# Patient Record
Sex: MALE | Race: WHITE | Employment: UNEMPLOYED | URBAN - METROPOLITAN AREA
[De-identification: names, ages, dates, MRNs, and addresses within clinical notes are randomized per-mention and may not be internally consistent; named-entity substitution may affect disease eponyms.]

---

## 2020-01-27 ENCOUNTER — OFFICE VISIT (OUTPATIENT)
Dept: DERMATOLOGY | Facility: CLINIC | Age: 30
End: 2020-01-27
Payer: COMMERCIAL

## 2020-01-27 DIAGNOSIS — L70.0 ACNE VULGARIS: ICD-10-CM

## 2020-01-27 DIAGNOSIS — Z79.899 ON ISOTRETINOIN THERAPY: ICD-10-CM

## 2020-01-27 DIAGNOSIS — Z79.899 ON ISOTRETINOIN THERAPY: Primary | ICD-10-CM

## 2020-01-27 LAB
ALBUMIN SERPL-MCNC: 4.1 G/DL (ref 3.4–5)
ALP SERPL-CCNC: 67 U/L (ref 40–150)
ALT SERPL W P-5'-P-CCNC: 21 U/L (ref 0–70)
ANION GAP SERPL CALCULATED.3IONS-SCNC: 6 MMOL/L (ref 3–14)
AST SERPL W P-5'-P-CCNC: 24 U/L (ref 0–45)
BASOPHILS # BLD AUTO: 0.1 10E9/L (ref 0–0.2)
BASOPHILS NFR BLD AUTO: 0.6 %
BILIRUB SERPL-MCNC: 0.5 MG/DL (ref 0.2–1.3)
BUN SERPL-MCNC: 12 MG/DL (ref 7–30)
CALCIUM SERPL-MCNC: 9 MG/DL (ref 8.5–10.1)
CHLORIDE SERPL-SCNC: 101 MMOL/L (ref 94–109)
CHOLEST SERPL-MCNC: 220 MG/DL
CO2 SERPL-SCNC: 29 MMOL/L (ref 20–32)
CREAT SERPL-MCNC: 0.92 MG/DL (ref 0.66–1.25)
DIFFERENTIAL METHOD BLD: NORMAL
EOSINOPHIL # BLD AUTO: 0.1 10E9/L (ref 0–0.7)
EOSINOPHIL NFR BLD AUTO: 1.3 %
ERYTHROCYTE [DISTWIDTH] IN BLOOD BY AUTOMATED COUNT: 13.7 % (ref 10–15)
GFR SERPL CREATININE-BSD FRML MDRD: >90 ML/MIN/{1.73_M2}
GLUCOSE SERPL-MCNC: 84 MG/DL (ref 70–99)
HCT VFR BLD AUTO: 46.3 % (ref 40–53)
HDLC SERPL-MCNC: 50 MG/DL
HGB BLD-MCNC: 15.5 G/DL (ref 13.3–17.7)
IMM GRANULOCYTES # BLD: 0 10E9/L (ref 0–0.4)
IMM GRANULOCYTES NFR BLD: 0.2 %
LDLC SERPL CALC-MCNC: 143 MG/DL
LYMPHOCYTES # BLD AUTO: 1.6 10E9/L (ref 0.8–5.3)
LYMPHOCYTES NFR BLD AUTO: 16.6 %
MCH RBC QN AUTO: 31.2 PG (ref 26.5–33)
MCHC RBC AUTO-ENTMCNC: 33.5 G/DL (ref 31.5–36.5)
MCV RBC AUTO: 93 FL (ref 78–100)
MONOCYTES # BLD AUTO: 0.7 10E9/L (ref 0–1.3)
MONOCYTES NFR BLD AUTO: 7.5 %
NEUTROPHILS # BLD AUTO: 7.3 10E9/L (ref 1.6–8.3)
NEUTROPHILS NFR BLD AUTO: 73.8 %
NONHDLC SERPL-MCNC: 170 MG/DL
NRBC # BLD AUTO: 0 10*3/UL
NRBC BLD AUTO-RTO: 0 /100
PLATELET # BLD AUTO: 213 10E9/L (ref 150–450)
POTASSIUM SERPL-SCNC: 3.6 MMOL/L (ref 3.4–5.3)
PROT SERPL-MCNC: 7.5 G/DL (ref 6.8–8.8)
RBC # BLD AUTO: 4.97 10E12/L (ref 4.4–5.9)
SODIUM SERPL-SCNC: 136 MMOL/L (ref 133–144)
TRIGL SERPL-MCNC: 133 MG/DL
WBC # BLD AUTO: 9.9 10E9/L (ref 4–11)

## 2020-01-27 RX ORDER — ISOTRETINOIN 40 MG/1
40 CAPSULE ORAL DAILY
Qty: 30 CAPSULE | Refills: 0 | Status: SHIPPED | OUTPATIENT
Start: 2020-01-27 | End: 2020-02-24

## 2020-01-27 ASSESSMENT — PAIN SCALES - GENERAL: PAINLEVEL: NO PAIN (0)

## 2020-01-27 NOTE — LETTER
"1/27/2020       RE: Collins Dunaway  [address]  [facility] [address]     Dear Colleague,    Thank you for referring your patient, Collins Dunaway, to the [facility] DERMATOLOGY at [facility]. Please see a copy of my visit note below.    [facility] Dermatology Note      Dermatology Problem List:  1. Acne vulgaris  -Current tx: Start isotretinoin 40 mg every day 1/27/2020 - start of month #1    Encounter Date: Jan 27, 2020    CC:  Chief Complaint   Patient presents with     Derm Problem     Collins is here today to be seen for acne.          History of Present Illness:  Mr. Collins Dunaway is a 29 year old male who is new to the clinic and presents for acne. At today's visit, the patient notes he has been dealing with body acne on and off \"forever\". He states he has tried benzoyl peroxide and moisturizer to treat this acne. He adds he went to a dermatologist approximately 8 years ago but did not follow through with their treatment plan. He denies taking any medications currently. He states he is using BPO 2.5% wash, cleanser and moisturizer from [email or web address] on his entire body. He has noticed some improvement on this regimen. He denies additional lesions or areas of concern. The patient denies a history of depression or anxiety. The patient denies a history of IBS or Crohn's disease. The patient denies painful, itching, tingling or bleeding lesions unless otherwise noted.    Past Medical History:   There is no problem list on file for this patient.    History reviewed. No pertinent past medical history.  History reviewed. No pertinent surgical history.    Social History:   reports that he has never smoked. He has never used smokeless tobacco.    Family History:  History reviewed. No pertinent family history.    Medications:  No current outpatient medications on file.       Allergies   Allergen Reactions     Cats        Review of Systems:  -Constitutional: The " patient denies fatigue, fevers, chills, unintended weight loss, and night sweats.  -HEENT: Patient denies nonhealing oral sores.  -Skin: As above in HPI. No additional skin concerns.  -Neuro: no HA or vision changes  -GI: No nausea, blood in stool, diarrhea, hx of IBD  -Psych: no depression/anxiety, mood changes, or sleep problems   -Musculoskeletal: no joint or muscle pain or swelling   -Heme/Lymph: no concerning bumps, no bleeding problems    Physical exam:  Vitals: There were no vitals taken for this visit.  GEN: This is a well developed, well-nourished male in no acute distress, in a pleasant mood.    SKIN: Acne exam, which includes the face, neck, upper central chest, and upper central back was performed.  -Scattered active papules and comedones with numerous hypo- and hyperpigmented scars and brown macules  -Scattered inflammatory papules and inflamed nodules on the chest    -Few active papules on the lower face  -No other lesions of concern on areas examined.     Impression/Plan:  1. Acne vulgaris, severe nodulocystic primarily on the trunk/torso    Educated on the etiology     Discussed treatment options including oral antibiotics and accutane  Discussion of the risks and side effects of isotretinoin including but not limited to mucocutaneous dryness, arthralgias, myalgias, depression, suicidal ideation, headache, blurred vision, increase in liver function tests, and increase in lipids. The iPledge program brochure was provided and the contents discussed with the patient. The patient was counseled that they cannot give blood while on isotretinoin. No personal or family history of inflammatory bowel disease or hypertriglyceridemia known to patient. Reviewed need to avoid alcohol on medication. The iPledge program consent was obtained. Patient counseled that if they wear contacts, the eyes may become too dry to tolerate. Recommend follow up with eye doctor if this occurs.   At this visit, we will begin  isotretinoin 40mg daily. One month supply with no refills provided. Goal dose is 150-220mg/kg for this 81.81 kg patient.    Baseline labs including CBC, BUN/Cr, lipids and AST/ALT will be obtained.     Total cumulative dose 0mg.   Patient's iPledge # is [id number].     The patient will stop all other acne medications.       Recommended Cerave moisturizing cream and Cetaphil cleanser    CC Dr. Joyce on close of this encounter.  Follow-up in 1 month, earlier for new or changing lesions.       Staff Involved:  Staff/Scribe    Scribe Disclosure:  I, Mike Holcomb, am serving as a scribe to document services personally performed by Elaine Reyes PA-C, based on data collection and the provider's statements to me.     Provider Disclosure:   The documentation recorded by the scribe accurately reflects the services I personally performed and the decisions made by me.    All risks, benefits and alternatives were discussed with patient.  Patient is in agreement and understands the assessment and plan.  All questions were answered.    Elaine Reyes PA-C, [facility]  [facility] [place]: Phone: [phone], Fax: [phone]  [facility]: Phone: [phone],  Fax: [phone]

## 2020-01-28 NOTE — PROGRESS NOTES
"[facility] Dermatology Note      Dermatology Problem List:  1. Acne vulgaris  -Current tx: Start isotretinoin 40 mg every day 1/27/2020 - start of month #1    Encounter Date: Jan 27, 2020    CC:  Chief Complaint   Patient presents with     Derm Problem     Collins is here today to be seen for acne.          History of Present Illness:  Mr. Collins Dunaway is a 29 year old male who is new to the clinic and presents for acne. At today's visit, the patient notes he has been dealing with body acne on and off \"forever\". He states he has tried benzoyl peroxide and moisturizer to treat this acne. He adds he went to a dermatologist approximately 8 years ago but did not follow through with their treatment plan. He denies taking any medications currently. He states he is using BPO 2.5% wash, cleanser and moisturizer from [email or web address] on his entire body. He has noticed some improvement on this regimen. He denies additional lesions or areas of concern. The patient denies a history of depression or anxiety. The patient denies a history of IBS or Crohn's disease. The patient denies painful, itching, tingling or bleeding lesions unless otherwise noted.    Past Medical History:   There is no problem list on file for this patient.    History reviewed. No pertinent past medical history.  History reviewed. No pertinent surgical history.    Social History:   reports that he has never smoked. He has never used smokeless tobacco.    Family History:  History reviewed. No pertinent family history.    Medications:  No current outpatient medications on file.       Allergies   Allergen Reactions     Cats        Review of Systems:  -Constitutional: The patient denies fatigue, fevers, chills, unintended weight loss, and night sweats.  -HEENT: Patient denies nonhealing oral sores.  -Skin: As above in HPI. No additional skin concerns.  -Neuro: no HA or vision changes  -GI: No nausea, blood in stool, diarrhea, hx of IBD  -Psych: no " depression/anxiety, mood changes, or sleep problems   -Musculoskeletal: no joint or muscle pain or swelling   -Heme/Lymph: no concerning bumps, no bleeding problems    Physical exam:  Vitals: There were no vitals taken for this visit.  GEN: This is a well developed, well-nourished male in no acute distress, in a pleasant mood.    SKIN: Acne exam, which includes the face, neck, upper central chest, and upper central back was performed.  -Scattered active papules and comedones with numerous hypo- and hyperpigmented scars and brown macules  -Scattered inflammatory papules and inflamed nodules on the chest    -Few active papules on the lower face  -No other lesions of concern on areas examined.     Impression/Plan:  1. Acne vulgaris, severe nodulocystic primarily on the trunk/torso    Educated on the etiology     Discussed treatment options including oral antibiotics and accutane  Discussion of the risks and side effects of isotretinoin including but not limited to mucocutaneous dryness, arthralgias, myalgias, depression, suicidal ideation, headache, blurred vision, increase in liver function tests, and increase in lipids. The iPledge program brochure was provided and the contents discussed with the patient. The patient was counseled that they cannot give blood while on isotretinoin. No personal or family history of inflammatory bowel disease or hypertriglyceridemia known to patient. Reviewed need to avoid alcohol on medication. The iPledge program consent was obtained. Patient counseled that if they wear contacts, the eyes may become too dry to tolerate. Recommend follow up with eye doctor if this occurs.   At this visit, we will begin isotretinoin 40mg daily. One month supply with no refills provided. Goal dose is 150-220mg/kg for this 81.81 kg patient.    Baseline labs including CBC, BUN/Cr, lipids and AST/ALT will be obtained.     Total cumulative dose 0mg.   Patient's iPledge # is [id number].     The patient will  stop all other acne medications.       Recommended Cerave moisturizing cream and Cetaphil cleanser    CC Dr. Joyce on close of this encounter.  Follow-up in 1 month, earlier for new or changing lesions.       Staff Involved:  Staff/Scribe    Scribe Disclosure:  I, Mike Holcomb, am serving as a scribe to document services personally performed by Elaine Reyes PA-C, based on data collection and the provider's statements to me.     Provider Disclosure:   The documentation recorded by the scribe accurately reflects the services I personally performed and the decisions made by me.    All risks, benefits and alternatives were discussed with patient.  Patient is in agreement and understands the assessment and plan.  All questions were answered.    Elaine Reyes PA-C, MPAS  [facility] [place]: Phone: [phone], Fax: [phone]  [facility]: Phone: [phone],  Fax: [phone]

## 2020-01-28 NOTE — NURSING NOTE
Dermatology Rooming Note    Collins Dunaway's goals for this visit include:   Chief Complaint   Patient presents with     Derm Problem     Collins is here today to be seen for acne.      JOY Sarkar

## 2020-01-29 DIAGNOSIS — Z79.899 ON ISOTRETINOIN THERAPY: Primary | ICD-10-CM

## 2020-02-20 DIAGNOSIS — Z79.899 ON ISOTRETINOIN THERAPY: ICD-10-CM

## 2020-02-20 LAB
ALBUMIN SERPL-MCNC: 4.6 G/DL (ref 3.4–5)
ALP SERPL-CCNC: 72 U/L (ref 40–150)
ALT SERPL W P-5'-P-CCNC: 14 U/L (ref 0–70)
ANION GAP SERPL CALCULATED.3IONS-SCNC: 8 MMOL/L (ref 3–14)
AST SERPL W P-5'-P-CCNC: 16 U/L (ref 0–45)
BILIRUB SERPL-MCNC: 0.4 MG/DL (ref 0.2–1.3)
BUN SERPL-MCNC: 15 MG/DL (ref 7–30)
CALCIUM SERPL-MCNC: 9.5 MG/DL (ref 8.5–10.1)
CHLORIDE SERPL-SCNC: 102 MMOL/L (ref 94–109)
CHOLEST SERPL-MCNC: 308 MG/DL
CO2 SERPL-SCNC: 26 MMOL/L (ref 20–32)
CREAT SERPL-MCNC: 0.88 MG/DL (ref 0.66–1.25)
ERYTHROCYTE [DISTWIDTH] IN BLOOD BY AUTOMATED COUNT: 13.2 % (ref 10–15)
GFR SERPL CREATININE-BSD FRML MDRD: >90 ML/MIN/{1.73_M2}
GLUCOSE SERPL-MCNC: 85 MG/DL (ref 70–99)
HCT VFR BLD AUTO: 50.9 % (ref 40–53)
HDLC SERPL-MCNC: 48 MG/DL
HGB BLD-MCNC: 17 G/DL (ref 13.3–17.7)
LDLC SERPL CALC-MCNC: 244 MG/DL
MCH RBC QN AUTO: 30.9 PG (ref 26.5–33)
MCHC RBC AUTO-ENTMCNC: 33.4 G/DL (ref 31.5–36.5)
MCV RBC AUTO: 93 FL (ref 78–100)
NONHDLC SERPL-MCNC: 260 MG/DL
PLATELET # BLD AUTO: 226 10E9/L (ref 150–450)
POTASSIUM SERPL-SCNC: 3.9 MMOL/L (ref 3.4–5.3)
PROT SERPL-MCNC: 8.1 G/DL (ref 6.8–8.8)
RBC # BLD AUTO: 5.5 10E12/L (ref 4.4–5.9)
SODIUM SERPL-SCNC: 137 MMOL/L (ref 133–144)
TRIGL SERPL-MCNC: 77 MG/DL
WBC # BLD AUTO: 7.8 10E9/L (ref 4–11)

## 2020-02-24 ENCOUNTER — OFFICE VISIT (OUTPATIENT)
Dept: DERMATOLOGY | Facility: CLINIC | Age: 30
End: 2020-02-24
Payer: COMMERCIAL

## 2020-02-24 DIAGNOSIS — Z79.899 ON ISOTRETINOIN THERAPY: ICD-10-CM

## 2020-02-24 DIAGNOSIS — L70.0 ACNE VULGARIS: Primary | ICD-10-CM

## 2020-02-24 RX ORDER — ISOTRETINOIN 40 MG/1
80 CAPSULE ORAL DAILY
Qty: 60 CAPSULE | Refills: 0 | Status: SHIPPED | OUTPATIENT
Start: 2020-02-24 | End: 2020-03-30

## 2020-02-24 ASSESSMENT — PAIN SCALES - GENERAL: PAINLEVEL: NO PAIN (0)

## 2020-02-24 NOTE — LETTER
2/24/2020       RE: Collins Dunaway  [address]  [facility] [address]     Dear Colleague,    Thank you for referring your patient, Collins Dunaway, to the [facility] DERMATOLOGY at [facility]. Please see a copy of my visit note below.    [facility] Dermatology Note      Dermatology Problem List:  1. Acne vulgaris  -Current tx: Start isotretinoin 40 mg every day 1/27/2020, increased to 80 mg every day 2/24/2020 - end of month #1    Encounter Date: Feb 24, 2020    CC:  Chief Complaint   Patient presents with     Accutane     Collins is here today for an accutane follow up.          History of Present Illness:  Mr. Collins Dunaway is a 29 year old male who presents as a follow up for acne. The patient was last seen on 1/27/2020 when isotretinoin 40 mg every day was started for acne. At today's visit, the patient notes he is doing well after the first month of accutane. The patient states dryness has been noticeable but manageable with moisturizer use. He notes that his acne on the face has improved since starting accutane but believes his back acne is still the same. Does not note an acne flare. The patient denies additional lesions or areas of concern. The patient reports tolerable mucocutaneous dryness, and denies arthralgias, myalgias, depression, suicidal ideation, diarrhea, headache, or blurred vision.      Past Medical History:   There is no problem list on file for this patient.    No past medical history on file.  No past surgical history on file.    Social History:   reports that he has never smoked. He has never used smokeless tobacco.    Family History:  No family history on file.    Medications:  Current Outpatient Medications   Medication Sig Dispense Refill     ISOtretinoin (ACCUTANE) 40 MG capsule Take 1 capsule (40 mg) by mouth daily 30 capsule 0       Allergies   Allergen Reactions     Cats        Review of Systems:  -Constitutional: The patient  denies fatigue, fevers, chills, unintended weight loss, and night sweats.  -HEENT: Patient denies nonhealing oral sores.  -Skin: As above in HPI. No additional skin concerns.  -Neuro: no HA or vision changes  -GI: No nausea, blood in stool, diarrhea, hx of IBD  -Psych: no depression/anxiety, mood changes, or sleep problems   -Musculoskeletal: no joint or muscle pain or swelling   -Heme/Lymph: no concerning bumps, no bleeding problems    Physical exam:  Vitals: There were no vitals taken for this visit.  GEN: This is a well developed, well-nourished male in no acute distress, in a pleasant mood.    SKIN: Acne exam, which includes the face, neck, upper central chest, and upper central back was performed.  -Scattered active papules and comedones with numerous hypo- and hyperpigmented scars and brown macules on the back. Unchanged since the last visit.   -Scattered inflammatory papules and inflamed nodules on the chest    -Face is improved  -No other lesions of concern on areas examined.     Impression/Plan:  1. Acne vulgaris, severe nodulocystic primarily on the trunk/torso    Educated on the etiology     Discussed treatment options including oral antibiotics and accutane  Discussion of the risks and side effects of isotretinoin including but not limited to mucocutaneous dryness, arthralgias, myalgias, depression, suicidal ideation, headache, blurred vision, increase in liver function tests, and increase in lipids. The patient was counseled that they cannot give blood while on isotretinoin.   At this visit, we will increase the dose of isotretinoin to 80mg daily. One month supply with no refills provided. Goal dose is 150-220mg/kg for this 81.81 kg patient.    Labs reviewed, lipids elevated, but patient was not fasting. CBC and CMP nml. Will repeat fasting lipids in 1 week. Explained to patient the importance of fasting lipids in order to get a more accurate result.    Total cumulative dose 1200 mg (14.67  mg/kg)  Patient's iPledge # is [id number].     Recommended Cerave moisturizing cream and Cetaphil cleanser    CC Dr. Joyce on close of this encounter.  Follow-up in 1 month, earlier for new or changing lesions.       Staff Involved:  Staff/Scribe    Scribe Disclosure:  I, Mike Holcomb, am serving as a scribe to document services personally performed by Elaine Reyes PA-C, based on data collection and the provider's statements to me.     Provider Disclosure:   The documentation recorded by the scribe accurately reflects the services I personally performed and the decisions made by me.    All risks, benefits and alternatives were discussed with patient.  Patient is in agreement and understands the assessment and plan.  All questions were answered.    Elaine Reyes PA-C, MPAS  [facility] [place]: Phone: [phone], Fax: [phone]  [facility]: Phone: [phone],  Fax: [phone]

## 2020-02-24 NOTE — NURSING NOTE
Dermatology Rooming Note    Collins Dunaway's goals for this visit include:   Chief Complaint   Patient presents with     Accutane     Collins is here today for an accutane follow up.      JOY Sarkar

## 2020-02-24 NOTE — PROGRESS NOTES
[facility] Dermatology Note      Dermatology Problem List:  1. Acne vulgaris  -Current tx: Start isotretinoin 40 mg every day 1/27/2020, increased to 80 mg every day 2/24/2020 - end of month #1    Encounter Date: Feb 24, 2020    CC:  Chief Complaint   Patient presents with     Accutane     Collins is here today for an accutane follow up.          History of Present Illness:  Mr. Collins Dunaway is a 29 year old male who presents as a follow up for acne. The patient was last seen on 1/27/2020 when isotretinoin 40 mg every day was started for acne. At today's visit, the patient notes he is doing well after the first month of accutane. The patient states dryness has been noticeable but manageable with moisturizer use. He notes that his acne on the face has improved since starting accutane but believes his back acne is still the same. Does not note an acne flare. The patient denies additional lesions or areas of concern. The patient reports tolerable mucocutaneous dryness, and denies arthralgias, myalgias, depression, suicidal ideation, diarrhea, headache, or blurred vision.      Past Medical History:   There is no problem list on file for this patient.    No past medical history on file.  No past surgical history on file.    Social History:   reports that he has never smoked. He has never used smokeless tobacco.    Family History:  No family history on file.    Medications:  Current Outpatient Medications   Medication Sig Dispense Refill     ISOtretinoin (ACCUTANE) 40 MG capsule Take 1 capsule (40 mg) by mouth daily 30 capsule 0       Allergies   Allergen Reactions     Cats        Review of Systems:  -Constitutional: The patient denies fatigue, fevers, chills, unintended weight loss, and night sweats.  -HEENT: Patient denies nonhealing oral sores.  -Skin: As above in HPI. No additional skin concerns.  -Neuro: no HA or vision changes  -GI: No nausea, blood in stool, diarrhea, hx of IBD  -Psych: no  depression/anxiety, mood changes, or sleep problems   -Musculoskeletal: no joint or muscle pain or swelling   -Heme/Lymph: no concerning bumps, no bleeding problems    Physical exam:  Vitals: There were no vitals taken for this visit.  GEN: This is a well developed, well-nourished male in no acute distress, in a pleasant mood.    SKIN: Acne exam, which includes the face, neck, upper central chest, and upper central back was performed.  -Scattered active papules and comedones with numerous hypo- and hyperpigmented scars and brown macules on the back. Unchanged since the last visit.   -Scattered inflammatory papules and inflamed nodules on the chest    -Face is improved  -No other lesions of concern on areas examined.     Impression/Plan:  1. Acne vulgaris, severe nodulocystic primarily on the trunk/torso    Educated on the etiology     Discussed treatment options including oral antibiotics and accutane  Discussion of the risks and side effects of isotretinoin including but not limited to mucocutaneous dryness, arthralgias, myalgias, depression, suicidal ideation, headache, blurred vision, increase in liver function tests, and increase in lipids. The patient was counseled that they cannot give blood while on isotretinoin.   At this visit, we will increase the dose of isotretinoin to 80mg daily. One month supply with no refills provided. Goal dose is 150-220mg/kg for this 81.81 kg patient.    Labs reviewed, lipids elevated, but patient was not fasting. CBC and CMP nml. Will repeat fasting lipids in 1 week. Explained to patient the importance of fasting lipids in order to get a more accurate result.    Total cumulative dose 1200 mg (14.67 mg/kg)  Patient's iPledge # is [id number].     Recommended Cerave moisturizing cream and Cetaphil cleanser    CC Dr. Joyce on close of this encounter.  Follow-up in 1 month, earlier for new or changing lesions.       Staff Involved:  Staff/Scribe    Scribe Disclosure:  Mike MARTIN  Zhang, am serving as a scribe to document services personally performed by Elaine Reyes PA-C, based on data collection and the provider's statements to me.     Provider Disclosure:   The documentation recorded by the scribe accurately reflects the services I personally performed and the decisions made by me.    All risks, benefits and alternatives were discussed with patient.  Patient is in agreement and understands the assessment and plan.  All questions were answered.    Elaine Reyes PA-C, MPAS  [facility] [place]: Phone: [phone], Fax: [phone]  [facility]: Phone: [phone],  Fax: [phone]

## 2020-03-11 ENCOUNTER — HEALTH MAINTENANCE LETTER (OUTPATIENT)
Age: 30
End: 2020-03-11

## 2020-03-16 ENCOUNTER — TELEPHONE (OUTPATIENT)
Dept: DERMATOLOGY | Facility: CLINIC | Age: 30
End: 2020-03-16

## 2020-03-16 NOTE — TELEPHONE ENCOUNTER
I called the patient back and changed his appointment to a phone appointment on the 23rd.   Angelica Garcia, [facility]

## 2020-03-16 NOTE — TELEPHONE ENCOUNTER
[facility] Call Center    Phone Message    May a detailed message be left on voicemail: yes     Reason for Call: Other: Pt recieved a call asking him to change this appt into a telephone appt.  The Pt said yes he would do the telephone, but I was unable to find that option in the change visit drop down for this appt.     Action Taken: Message routed to:  [facility] ([facility] dermatology    Travel Screening: Not Applicable

## 2020-03-16 NOTE — TELEPHONE ENCOUNTER
Called pt and LVM. I wanted to see if he would be willing to have a phone follow up on 3/23/20. Clinic number provided.  JOY Sarkar

## 2020-03-23 ENCOUNTER — VIRTUAL VISIT (OUTPATIENT)
Dept: DERMATOLOGY | Facility: CLINIC | Age: 30
End: 2020-03-23
Payer: COMMERCIAL

## 2020-03-23 DIAGNOSIS — Z79.899 ON ISOTRETINOIN THERAPY: Primary | ICD-10-CM

## 2020-03-23 DIAGNOSIS — L70.0 ACNE VULGARIS: ICD-10-CM

## 2020-03-23 ASSESSMENT — PAIN SCALES - GENERAL: PAINLEVEL: NO PAIN (0)

## 2020-03-23 NOTE — PROGRESS NOTES
"Collins Dunaway is a 29 year old male who is being evaluated via a billable telephone visit.      The patient has been notified of following:     \"This telephone visit will be conducted via a call between you and your physician/provider. We have found that certain health care needs can be provided without the need for a physical exam.  This service lets us provide the care you need with a short phone conversation.  If a prescription is necessary we can send it directly to your pharmacy.  If lab work is needed we can place an order for that and you can then stop by our lab to have the test done at a later time.    If during the course of the call the physician/provider feels a telephone visit is not appropriate, you will not be charged for this service.\"     The patient has verbally consented to this service.      Collins Dunaway complains of    Chief Complaint   Patient presents with     Accutane     Collins is following up on accutane.        I have reviewed and updated the patient's Past Medical History, Social History, Family History and Medication List.    ALLERGIES  Cats      Additional provider notes:   Dermatology Problem List:  1. Acne vulgaris  -Current tx: Start isotretinoin 40 mg every day 1/27/2020, increased to 80 mg every day 2/24/2020 - end of month #2    I spoke with the patient    The reason for he telephone visit was isotretinoin follow-up    Pertinent history and ROS    Collins Dunaway is a 28yo male patient who was last seen on 2/24/2020 regarding acne. He is currently on isotretinoin 80mg daily. He notes his chest acne has improved this past month as well as his back acne, but he has been getting some new breakouts there. He notes his face is clear. He states skin and especially his lips are dry but he is managing with moisturizers. The patient reports tolerable mucocutaneous dryness, and denies arthralgias, myalgias, depression, suicidal ideation, diarrhea, headache, or blurred vision.      Review " of Systems:  -Constitutional: The patient denies fatigue, fevers, chills, unintended weight loss, and night sweats.  -HEENT: Patient denies nonhealing oral sores.  -Skin: As above in HPI. No additional skin concerns.  -Neuro: no HA or vision changes  -GI: No nausea, blood in stool, diarrhea, hx of IBD  -Psych: no depression/anxiety, mood changes, or sleep problems   -Musculoskeletal: no joint or muscle pain or swelling   -Heme/Lymph: no concerning bumps, no bleeding problems       Assessment/Plan:    1. Acne vulgaris, severe nodulocystic primarily on the trunk/torso    Educated on the etiology      At this visit, we will continue the dose of isotretinoin to 80mg daily. One month supply with no refills provided. Goal dose is 150-220mg/kg for this 81.81 kg patient.      Labs reviewed, lipids elevated, but patient was not fasting. CBC and CMP nml. Will repeat fasting lipids in 1 week. Explained to patient the importance of fasting lipids in order to get a more accurate result.    Total cumulative dose 3600 mg (44mg/kg)    Patient's iPledge # is [id number].     Recommended Cerave moisturizing cream and Cetaphil cleanser    I have reviewed the note as documented above.  This accurately captures the substance of my conversation with the patient.    CC Dr. Joyce at the close of this encounter.  Follow-up 1mo, earlier for new or changing lesions    Phone call contact time  Call Started at 3:51pm  Call Ended at 3:58pm    All risks, benefits and alternatives were discussed with patient.  Patient is in agreement and understands the assessment and plan.  All questions were answered.    Elaine Reyes PA-C, MPAS  [facility] [place]: Phone: [phone], Fax: [phone]  [facility]: Phone: [phone],  Fax: [phone]

## 2020-03-23 NOTE — PROGRESS NOTES
Dermatology Rooming Note    Collins Dunaway's goals for this visit include:   Chief Complaint   Patient presents with     Accutane     Collins is following up on accutane.      JOY Sarkar

## 2020-03-26 DIAGNOSIS — Z79.899 ON ISOTRETINOIN THERAPY: ICD-10-CM

## 2020-03-26 LAB
ALBUMIN SERPL-MCNC: 4.2 G/DL (ref 3.4–5)
ALP SERPL-CCNC: 69 U/L (ref 40–150)
ALT SERPL W P-5'-P-CCNC: 21 U/L (ref 0–70)
ANION GAP SERPL CALCULATED.3IONS-SCNC: 4 MMOL/L (ref 3–14)
AST SERPL W P-5'-P-CCNC: 36 U/L (ref 0–45)
BILIRUB SERPL-MCNC: 0.4 MG/DL (ref 0.2–1.3)
BUN SERPL-MCNC: 12 MG/DL (ref 7–30)
CALCIUM SERPL-MCNC: 8.9 MG/DL (ref 8.5–10.1)
CHLORIDE SERPL-SCNC: 106 MMOL/L (ref 94–109)
CHOLEST SERPL-MCNC: 252 MG/DL
CO2 SERPL-SCNC: 28 MMOL/L (ref 20–32)
CREAT SERPL-MCNC: 0.96 MG/DL (ref 0.66–1.25)
ERYTHROCYTE [DISTWIDTH] IN BLOOD BY AUTOMATED COUNT: 13.4 % (ref 10–15)
GFR SERPL CREATININE-BSD FRML MDRD: >90 ML/MIN/{1.73_M2}
GLUCOSE SERPL-MCNC: 78 MG/DL (ref 70–99)
HCT VFR BLD AUTO: 45.5 % (ref 40–53)
HDLC SERPL-MCNC: 53 MG/DL
HGB BLD-MCNC: 14.8 G/DL (ref 13.3–17.7)
LDLC SERPL CALC-MCNC: 179 MG/DL
MCH RBC QN AUTO: 30.5 PG (ref 26.5–33)
MCHC RBC AUTO-ENTMCNC: 32.5 G/DL (ref 31.5–36.5)
MCV RBC AUTO: 94 FL (ref 78–100)
NONHDLC SERPL-MCNC: 198 MG/DL
PLATELET # BLD AUTO: 191 10E9/L (ref 150–450)
POTASSIUM SERPL-SCNC: 3.4 MMOL/L (ref 3.4–5.3)
PROT SERPL-MCNC: 7.6 G/DL (ref 6.8–8.8)
RBC # BLD AUTO: 4.86 10E12/L (ref 4.4–5.9)
SODIUM SERPL-SCNC: 139 MMOL/L (ref 133–144)
TRIGL SERPL-MCNC: 95 MG/DL
WBC # BLD AUTO: 7.4 10E9/L (ref 4–11)

## 2020-03-30 ENCOUNTER — MYC REFILL (OUTPATIENT)
Dept: DERMATOLOGY | Facility: CLINIC | Age: 30
End: 2020-03-30

## 2020-03-30 DIAGNOSIS — L70.0 ACNE VULGARIS: ICD-10-CM

## 2020-03-30 RX ORDER — ISOTRETINOIN 40 MG/1
80 CAPSULE ORAL DAILY
Qty: 60 CAPSULE | Refills: 0 | Status: SHIPPED | OUTPATIENT
Start: 2020-03-30 | End: 2020-04-20

## 2020-04-20 ENCOUNTER — VIRTUAL VISIT (OUTPATIENT)
Dept: DERMATOLOGY | Facility: CLINIC | Age: 30
End: 2020-04-20
Payer: COMMERCIAL

## 2020-04-20 DIAGNOSIS — Z79.899 ON ISOTRETINOIN THERAPY: Primary | ICD-10-CM

## 2020-04-20 DIAGNOSIS — L70.0 ACNE VULGARIS: ICD-10-CM

## 2020-04-20 RX ORDER — ISOTRETINOIN 40 MG/1
80 CAPSULE ORAL DAILY
Qty: 60 CAPSULE | Refills: 0 | Status: SHIPPED | OUTPATIENT
Start: 2020-04-20 | End: 2020-06-01

## 2020-04-20 ASSESSMENT — PAIN SCALES - GENERAL: PAINLEVEL: NO PAIN (0)

## 2020-04-20 NOTE — PATIENT INSTRUCTIONS
[facility] Teledermatology Visit    Thank you for allowing us to participate in your care. Your findings, instructions and follow-up plan are as follows:      Acne  - continue isotretinoin 80mg daily  -continue with sun protection  -please have fasting labs drawn as soon as you are able  -please upload photos to [employer] as soon as possible  -Follow-up in 1mo via video visit    When should I call my doctor?    If you are worsening or not improving, please, contact us or seek urgent care as noted below.     Who should I call with questions (adults)?    [facility] (adult and pediatric): [phone]     [facility] (adult): [phone]    For urgent needs outside of business hours call the [facility] at [phone] and ask for the dermatology resident on call    If this is a medical emergency and you are unable to reach an ER, Call 911      Who should I call with questions (pediatric)?  [facility]- Pediatric Dermatology  Dr. Michelle Jarvis, Dr. Otis Urias, Dr. Hannah Burciaga, [place], [place]  Dr. Leilani Jameson, Dr. Kori Joyce & Dr. Shoaib Oneill  Non Urgent  Nurse Triage Line; [phone]- Mi and Nilam FRANCES Care Coordinators   Emilie (/Complex ) [phone]    If you need a prescription refill, please contact your pharmacy. Refills are approved or denied by our Physicians during normal business hours, Monday through Fridays  Per office policy, refills will not be granted if you have not been seen within the past year (or sooner depending on your child's condition)    Scheduling Information:  Pediatric Appointment Scheduling and Call Center [phone]  Radiology Scheduling- [phone]  Sedation Unit Scheduling- [phone]  [place] Scheduling- General [phone]; Pediatric Dermatology [phone]  Main  Services: [phone]  Fijian:  [phone]  Moroccan: [phone]  Mel/Kinyarwanda/Carlito: [phone]  Preadmission Nursing Department Fax Number: [phone] (Fax all pre-operative paperwork to this number)    For urgent matters arising during evenings, weekends, or holidays that cannot wait for normal business hours please call [phone] and ask for the Dermatology Resident On-Call to be paged.

## 2020-04-20 NOTE — NURSING NOTE
Chief Complaint   Patient presents with     Accutane     Collins is following up on Accutane. No concerns.      Maribeth Cole LPN

## 2020-04-20 NOTE — PROGRESS NOTES
[facility]  [facility] and Forward and Tele[facility]      Impression and Recommendations (Patient Counseled on the Following):  1. Acne vulgaris, severe nodulocystic primarily on the trunk/torso  -Educated on the etiology    -At this visit, we will continue the dose of isotretinoin to 80mg daily. One month supply with no refills provided. Goal dose is 150-220mg/kg for this 81.81 kg patient.    -Labs reviewed, lipids elevated, but patient was not fasting. CBC and CMP nml. Will repeat fasting lipids in 1 week. Explained to patient the importance of fasting lipids in order to get a more accurate result.  -Total cumulative dose 6000 mg (73.34mg/kg)  -Patient's iPledge # is [id number].   -Recommended Cerave moisturizing cream and Cetaphil cleanser    Follow-up:   1mo, sooner for new or changing visits  CC Dr. Joyce on close of this encounter     Staff only:    All risks, benefits and alternatives were discussed with patient.  Patient is in agreement and understands the assessment and plan.  All questions were answered.    Elaine Reyes PA-C, MPAS  [facility] [place]: Phone: [phone], Fax: [phone]  [facility]: Phone: [phone],  Fax: [phone]  _____________________________________________________________________________    Dermatology Problem List:  1. Acne vulgaris  -Current tx: Start isotretinoin 40 mg every day 1/27/2020, increased to 80 mg every day 2/24/2020 - end of month #3    Encounter Date: Apr 20, 2020    CC:   Chief Complaint   Patient presents with     Accutane     Collins is following up on Accutane. No concerns.        History of Present Illness:  I have reviewed the teledermatology information and the nursing intake corresponding to this issue. Collins Dunaway is a 29 year old male who presents via teledermatology for acne/isotretinoin follow-up. Notes facial skin is clearing up. He still gets  some break outs on the chest and back. He is noticing dryness of the lips, but he has been using chap stick and this has been helping. Notes mild joint pain - lower back pain. States it is minor and has not needed NSAIDs for it. The patient reports tolerable mucocutaneous dryness, and denies arthralgias, myalgias, depression, suicidal ideation, diarrhea, headache, or blurred vision.        ROS: Patient is generally feeling well today   -Constitutional: The patient denies fatigue, fevers, chills, unintended weight loss, and night sweats.  -HEENT: Patient denies nonhealing oral sores.  -Skin: As above in HPI. No additional skin concerns.  -Neuro: no HA or vision changes  -GI: No nausea, blood in stool, diarrhea, hx of IBD  -Psych: no depression/anxiety, mood changes, or sleep problems   -Musculoskeletal: no joint or muscle pain or swelling   -Heme/Lymph: no concerning bumps, no bleeding problems    Physical Examination:  General: Well-appearing, appropriately-developed individual.  Skin: Focused examination within the teledermatology photograph(s) including head, neck, face, chest, back, upper abdomen, shoulders was performed.   -face is clear - no active papules  -scattered erythematous papules and hyperpigmented macules on the chest as well as the back - back>chest    Past Medical History:   There is no problem list on file for this patient.    No past medical history on file.  No past surgical history on file.    Social History:   reports that he has never smoked. He has never used smokeless tobacco.    Family History:  No family history on file.    Medications:  Current Outpatient Medications   Medication     ISOtretinoin (ACCUTANE) 40 MG capsule     No current facility-administered medications for this visit.        Allergies   Allergen Reactions     Cats      _____________________________________________________________________________    Teledermatology information:  - Location of patient: Home  - Patient  presented as: return  - Location of teledermatologist:  ([facility] DERMATOLOGY )  - Reason teledermatology is appropriate:  of National Emergency Regarding Coronavirus disease (COVID 19) Outbreak  - Image quality and interpretability: acceptable  - Physician has received verbal consent for a Video/Photos Visit from the patient? No  - In-person dermatology visit recommendation: no  - Date of images: 4/20/20  - Service start time: 3:38pm  - Service end time:3:44pm  - Date of report: 4/20/2020

## 2020-04-21 DIAGNOSIS — Z79.899 ON ISOTRETINOIN THERAPY: ICD-10-CM

## 2020-04-21 LAB
CHOLEST SERPL-MCNC: 246 MG/DL
HDLC SERPL-MCNC: 54 MG/DL
LDLC SERPL CALC-MCNC: 180 MG/DL
NONHDLC SERPL-MCNC: 193 MG/DL
TRIGL SERPL-MCNC: 64 MG/DL

## 2020-05-19 ENCOUNTER — TELEPHONE (OUTPATIENT)
Dept: DERMATOLOGY | Facility: CLINIC | Age: 30
End: 2020-05-19

## 2020-05-19 NOTE — TELEPHONE ENCOUNTER
Called pt and LVM. I let him know that his appointment time on 6/1/20 needs to be changed. Our clinic hours are 8-5 so he can choose anytime around then. Clinic number provided.  JOY Sarkar

## 2020-05-26 ENCOUNTER — TELEPHONE (OUTPATIENT)
Dept: DERMATOLOGY | Facility: CLINIC | Age: 30
End: 2020-05-26

## 2020-05-26 NOTE — TELEPHONE ENCOUNTER
Called pt and LVM. I let him know we need to change his appointment time on 6/1/20 to anytime after 8am and before 5pm. Clinic number provided.  JOY Sarkar

## 2020-06-01 ENCOUNTER — VIRTUAL VISIT (OUTPATIENT)
Dept: DERMATOLOGY | Facility: CLINIC | Age: 30
End: 2020-06-01
Payer: COMMERCIAL

## 2020-06-01 DIAGNOSIS — L70.0 ACNE VULGARIS: ICD-10-CM

## 2020-06-01 RX ORDER — ISOTRETINOIN 40 MG/1
80 CAPSULE ORAL DAILY
Qty: 60 CAPSULE | Refills: 0 | Status: SHIPPED | OUTPATIENT
Start: 2020-06-01 | End: 2020-06-29

## 2020-06-01 ASSESSMENT — PAIN SCALES - GENERAL: PAINLEVEL: NO PAIN (0)

## 2020-06-01 NOTE — PATIENT INSTRUCTIONS
[facility] Teledermatology Visit    Thank you for allowing us to participate in your care. Your findings, instructions and follow-up plan are as follows:    Acne  -continue current treatment plan  -continue OTC fish oil supplement    When should I call my doctor?    If you are worsening or not improving, please, contact us or seek urgent care as noted below.     Who should I call with questions (adults)?    [facility] (adult and pediatric): [phone]     [facility] (adult): [phone]    For urgent needs outside of business hours call the [facility] at [phone] and ask for the dermatology resident on call    If this is a medical emergency and you are unable to reach an ER, Call [phone]      Who should I call with questions (pediatric)?  [facility]- Pediatric Dermatology  Dr. Michelle Jarvis, Dr. Otis Urias, Dr. Hannah Burciaga, [place], [place]  Dr. Leilani Jameson, Dr. Kori Joyce & Dr. Shoaib Oneill  Non Urgent  Nurse Triage Line; [phone]- Mi and Nilam RN Care Coordinators   Emilie (/Complex ) [phone]    If you need a prescription refill, please contact your pharmacy. Refills are approved or denied by our Physicians during normal business hours, Monday through Fridays  Per office policy, refills will not be granted if you have not been seen within the past year (or sooner depending on your child's condition)    Scheduling Information:  Pediatric Appointment Scheduling and Call Center [phone]  Radiology Scheduling- [phone]  Sedation Unit Scheduling- [phone]  [place] Scheduling- General [phone]; Pediatric Dermatology [phone]  Main  Services: [phone]  Jordanian: [phone]  Mosotho: [phone]  Hmong/Malaysian/Salvadorean: [phone]  Preadmission Nursing Department Fax Number: [phone] (Fax all pre-operative  paperwork to this number)    For urgent matters arising during evenings, weekends, or holidays that cannot wait for normal business hours please call [phone] and ask for the Dermatology Resident On-Call to be paged.

## 2020-06-01 NOTE — LETTER
Date:June 2, 2020      Patient was self referred, no letter generated. Do not send.        [facility] Physicians Health Information

## 2020-06-01 NOTE — PROGRESS NOTES
[facility]  Store and Forward and Telephone    Impression and Recommendations (Patient Counseled on the Following):  1.Acne vulgaris, severe nodulocystic primarily on the trunk/torso  -Educated on the etiology    -At this visit, we will continue the dose of isotretinoin to 80mg daily. One month supply with no refills provided. Goal dose is 150-220mg/kg for this 81.81 kg patient.    -Labs reviewed, mildly lipids elevated, but not triglycerides. Stable. No need for repeat lab testing. Advised increase in physical activity and dietary changes.  -Total cumulative dose 8400 mg (102.67mg/kg)  -Patient's iPledge # is [id number].   -Recommended Cerave moisturizing cream and Cetaphil cleanser    Follow-up:   Follow-up with dermatology in approximately 1mo. Earlier for new or changing lesions or rash.   CC Dr. Joyce on close of this encounter     Staff only:    All risks, benefits and alternatives were discussed with patient.  Patient is in agreement and understands the assessment and plan.  All questions were answered.    Elaine Reyes PA-C, MPAS  [facility] [place]: Phone: [phone], Fax: [phone]  [facility]: Phone: [phone],  Fax: [phone]  _____________________________________________________________________________    Dermatology Problem List:  1. Acne vulgaris  -Current tx: Start isotretinoin 40 mg every day 1/27/2020, increased to 80 mg every day 2/24/2020 - end of month #4    Encounter Date: Jun 1, 2020    CC:   Chief Complaint   Patient presents with     Accutane     Collins is following up on accutane.        History of Present Illness:  I have reviewed the teledermatology information and the nursing intake corresponding to this issue. Collins Dunaway is a 29 year old male who presents via teledermatology for accutane/acne follow-up. He has been on 80mg of isotretinoin daily and is tolerating it  well. Acne continues to improve. Face is clear, but some remaining red spots on the chest and back. Reviewed fasting lipids and total cholesterol is high, even while fasting. Triglycerides however are nml. He just recently started a fish oil supplement. The patient reports tolerable mucocutaneous dryness, and denies arthralgias, myalgias, depression, suicidal ideation, diarrhea, headache, or blurred vision.     ROS: Patient is generally feeling well today   -Constitutional: The patient denies fatigue, fevers, chills, unintended weight loss, and night sweats.  -HEENT: Patient denies nonhealing oral sores.  -Skin: As above in HPI. No additional skin concerns.  -Neuro: no HA or vision changes  -GI: No nausea, blood in stool, diarrhea, hx of IBD  -Psych: no depression/anxiety, mood changes, or sleep problems   -Musculoskeletal: no joint or muscle pain or swelling   -Heme/Lymph: no concerning bumps, no bleeding problems    Physical Examination:  General: Well-appearing, appropriately-developed individual.  Skin: Focused examination within the teledermatology photograph(s) including head, neck, face, chest, back, and bilateral upper extremities was performed.   -face is clear  -chest with scattered erythematous macules  -back with scattered erythematous macules an few papules    Labs:  Reviewed labs with Collins, suggest he continue to monitor his cholesterol    Past Medical History:   There is no problem list on file for this patient.    No past medical history on file.  No past surgical history on file.    Social History:  Patient reports that he has never smoked. He has never used smokeless tobacco.    Family History:  No family history on file.    Medications:  Current Outpatient Medications   Medication     ISOtretinoin (ACCUTANE) 40 MG capsule     No current facility-administered medications for this visit.         Allergies   Allergen Reactions     Cats       _____________________________________________________________________________    Teledermatology information:  - Location of patient in [place]: Home  - Patient presented as: return  - Location of teledermatologist:  [facility] DERMATOLOGY )  - Reason teledermatology is appropriate:  of National Emergency Regarding Coronavirus disease (COVID 19) Outbreak  - Image quality and interpretability: acceptable  - Physician has received verbal consent for a Video/Photos Visit from the patient? Yes  - In-person dermatology visit recommendation: no  - Date of images: 5/30/20  - Service start time:1:30pm  - Service end time:1:37pm  - Date of report: 6/1/2020

## 2020-06-01 NOTE — LETTER
6/1/2020       RE: Collins Dunaway  [address]  [place] [address]     Dear Colleague,    Thank you for referring your patient, Collins Dunaway, to [facility]. Please see a copy of my visit note below.    [facility]  [facility] and Forward and [facility]    Impression and Recommendations (Patient Counseled on the Following):  1.Acne vulgaris, severe nodulocystic primarily on the trunk/torso  -Educated on the etiology    -At this visit, we will continue the dose of isotretinoin to 80mg daily. One month supply with no refills provided. Goal dose is 150-220mg/kg for this 81.81 kg patient.    -Labs reviewed, mildly lipids elevated, but not triglycerides. Stable. No need for repeat lab testing. Advised increase in physical activity and dietary changes.  -Total cumulative dose 8400 mg (102.67mg/kg)  -Patient's iPledge # is [id number].   -Recommended Cerave moisturizing cream and Cetaphil cleanser    Follow-up:   Follow-up with dermatology in approximately 1mo. Earlier for new or changing lesions or rash.   CC Dr. Joyce on close of this encounter     Staff only:    All risks, benefits and alternatives were discussed with patient.  Patient is in agreement and understands the assessment and plan.  All questions were answered.    Elaine Reyes PA-C, MPAS  [facility] [place]: Phone: [phone], Fax: [phone]  [facility]: Phone: [phone],  Fax: [phone]  _____________________________________________________________________________    Dermatology Problem List:  1. Acne vulgaris  -Current tx: Start isotretinoin 40 mg every day 1/27/2020, increased to 80 mg every day 2/24/2020 - end of month #4    Encounter Date: Jun 1, 2020    CC:   Chief Complaint   Patient presents with     Accutane     Collins is following up on accutane.        History of  Present Illness:  I have reviewed the teledermatology information and the nursing intake corresponding to this issue. Collins Dunaway is a 29 year old male who presents via teledermatology for accutane/acne follow-up. He has been on 80mg of isotretinoin daily and is tolerating it well. Acne continues to improve. Face is clear, but some remaining red spots on the chest and back. Reviewed fasting lipids and total cholesterol is high, even while fasting. Triglycerides however are nml. He just recently started a fish oil supplement. The patient reports tolerable mucocutaneous dryness, and denies arthralgias, myalgias, depression, suicidal ideation, diarrhea, headache, or blurred vision.     ROS: Patient is generally feeling well today   -Constitutional: The patient denies fatigue, fevers, chills, unintended weight loss, and night sweats.  -HEENT: Patient denies nonhealing oral sores.  -Skin: As above in HPI. No additional skin concerns.  -Neuro: no HA or vision changes  -GI: No nausea, blood in stool, diarrhea, hx of IBD  -Psych: no depression/anxiety, mood changes, or sleep problems   -Musculoskeletal: no joint or muscle pain or swelling   -Heme/Lymph: no concerning bumps, no bleeding problems    Physical Examination:  General: Well-appearing, appropriately-developed individual.  Skin: Focused examination within the teledermatology photograph(s) including head, neck, face, chest, back, and bilateral upper extremities was performed.   -face is clear  -chest with scattered erythematous macules  -back with scattered erythematous macules an few papules    Labs:  Reviewed labs with Collins, suggest he continue to monitor his cholesterol    Past Medical History:   There is no problem list on file for this patient.    No past medical history on file.  No past surgical history on file.    Social History:  Patient reports that he has never smoked. He has never used smokeless tobacco.    Family History:  No family history on  file.    Medications:  Current Outpatient Medications   Medication     ISOtretinoin (ACCUTANE) 40 MG capsule     No current facility-administered medications for this visit.         Allergies   Allergen Reactions     Cats      _____________________________________________________________________________    Teledermatology information:  - Location of patient in [place]: Home  - Patient presented as: return  - Location of teledermatologist:  ([facility] DERMATOLOGY )  - Reason teledermatology is appropriate:  of National Emergency Regarding Coronavirus disease (COVID 19) Outbreak  - Image quality and interpretability: acceptable  - Physician has received verbal consent for a Video/Photos Visit from the patient? Yes  - In-person dermatology visit recommendation: no  - Date of images: 5/30/20  - Service start time:1:30pm  - Service end time:1:37pm - Date of report: 6/1/2020       Again, thank you for allowing me to participate in the care of your patient.      Sincerely,    Elaine Reyes PA-C

## 2020-06-29 ENCOUNTER — VIRTUAL VISIT (OUTPATIENT)
Dept: DERMATOLOGY | Facility: CLINIC | Age: 30
End: 2020-06-29
Payer: COMMERCIAL

## 2020-06-29 DIAGNOSIS — L70.0 ACNE VULGARIS: ICD-10-CM

## 2020-06-29 RX ORDER — ISOTRETINOIN 40 MG/1
80 CAPSULE ORAL DAILY
Qty: 60 CAPSULE | Refills: 0 | Status: SHIPPED | OUTPATIENT
Start: 2020-06-29 | End: 2020-07-27

## 2020-06-29 ASSESSMENT — PAIN SCALES - GENERAL: PAINLEVEL: NO PAIN (0)
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
## 2020-06-29 NOTE — PATIENT INSTRUCTIONS
[facility] Teledermatology Visit    Thank you for allowing us to participate in your care. Your findings, instructions and follow-up plan are as follows:    Acne  -continue current treatment plan    When should I call my doctor?    If you are worsening or not improving, please, contact us or seek urgent care as noted below.     Who should I call with questions (adults)?    [facility] (adult and pediatric): [phone]     [facility] (adult): [phone]    For urgent needs outside of business hours call the [facility] at [phone] and ask for the dermatology resident on call    If this is a medical emergency and you are unable to reach an ER, Call [phone]      Who should I call with questions (pediatric)?  [facility]- Pediatric Dermatology  Dr. Michelle Jarvis, Dr. Otis Urias, Dr. Hannah Burciaga, La Nena Patterson, [place]  Dr. eLilani Jameson, Dr. Kori Joyce & Dr. Shoaib Oneill  Non Urgent  Nurse Triage Line; [phone]- Mi and Nilam FRANCES Care Coordinators   Emilie (/Complex ) [phone]    If you need a prescription refill, please contact your pharmacy. Refills are approved or denied by our Physicians during normal business hours, Monday through Fridays  Per office policy, refills will not be granted if you have not been seen within the past year (or sooner depending on your child's condition)    Scheduling Information:  Pediatric Appointment Scheduling and Call Center [phone]  Radiology Scheduling- [phone]  Sedation Unit Scheduling- [phone]  [place] Scheduling- General [phone]; Pediatric Dermatology [phone]  Main  Services: [phone]  Bulgarian: [phone]  [place] citizen: [phone]  Hmong/Turkish/Macedonian: [phone]  Preadmission Nursing Department Fax Number: [phone] (Fax all pre-operative paperwork to this number)    For  urgent matters arising during evenings, weekends, or holidays that cannot wait for normal business hours please call [phone] and ask for the Dermatology Resident On-Call to be paged.
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
## 2020-07-27 ENCOUNTER — VIRTUAL VISIT (OUTPATIENT)
Dept: DERMATOLOGY | Facility: CLINIC | Age: 30
End: 2020-07-27
Payer: COMMERCIAL

## 2020-07-27 DIAGNOSIS — L08.9 SUPERFICIAL BACTERIAL INFECTION OF SKIN: ICD-10-CM

## 2020-07-27 DIAGNOSIS — Z79.899 ON ISOTRETINOIN THERAPY: Primary | ICD-10-CM

## 2020-07-27 DIAGNOSIS — L70.0 ACNE VULGARIS: ICD-10-CM

## 2020-07-27 DIAGNOSIS — B96.89 SUPERFICIAL BACTERIAL INFECTION OF SKIN: ICD-10-CM

## 2020-07-27 RX ORDER — ISOTRETINOIN 40 MG/1
80 CAPSULE ORAL DAILY
Qty: 60 CAPSULE | Refills: 0 | Status: SHIPPED | OUTPATIENT
Start: 2020-07-27 | End: 2020-08-24

## 2020-07-27 RX ORDER — CEPHALEXIN 500 MG/1
500 CAPSULE ORAL 2 TIMES DAILY
Qty: 28 CAPSULE | Refills: 0 | Status: SHIPPED | OUTPATIENT
Start: 2020-07-27 | End: 2020-08-24

## 2020-07-27 ASSESSMENT — PAIN SCALES - GENERAL: PAINLEVEL: NO PAIN (0)
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
## 2020-07-27 NOTE — PATIENT INSTRUCTIONS
[facility] Dermatology Visit    Thank you for allowing us to participate in your care. Your findings, instructions and follow-up plan are as follows:    Acne  -continue isotretinoin 80mg daily  -Start Cephalexin (Keflex) 500mg twice daily x 14 days    When should I call my doctor?    If you are worsening or not improving, please, contact us or seek urgent care as noted below.     Who should I call with questions (adults)?    [facility] (adult and pediatric): [phone]     [facility] (adult): [phone]    For urgent needs outside of business hours call the [facility] at [phone] and ask for the dermatology resident on call    If this is a medical emergency and you are unable to reach an ER, Call [phone]      Who should I call with questions (pediatric)?  [facility]- Pediatric Dermatology  Dr. Michelle Jarvis, Dr. Otis Urias, Dr. Hannah Burciaga, La Nena Patterson, PA  Dr. Leilani Jameson, Dr. Kori Joyce & Dr. Shoaib Oneill  Non Urgent  Nurse Triage Line; [phone]- Mi and Nilam RN Care Coordinators   Emilie (/Complex ) [phone]    If you need a prescription refill, please contact your pharmacy. Refills are approved or denied by our Physicians during normal business hours, Monday through Fridays  Per office policy, refills will not be granted if you have not been seen within the past year (or sooner depending on your child's condition)    Scheduling Information:  Pediatric Appointment Scheduling and Call Center [phone]  Radiology Scheduling- [phone]  Sedation Unit Scheduling- [phone]  [place] Scheduling- General [phone]; Pediatric Dermatology [phone]  Main  Services: [phone]  Estonian: [phone]  South African: [phone]  Hmong/Latvian/Carlito: [phone]  Preadmission Nursing Department Fax Number: [phone] (Fax  all pre-operative paperwork to this number)    For urgent matters arising during evenings, weekends, or holidays that cannot wait for normal business hours please call [phone] and ask for the Dermatology Resident On-Call to be paged.

## 2020-08-24 ENCOUNTER — VIRTUAL VISIT (OUTPATIENT)
Dept: DERMATOLOGY | Facility: CLINIC | Age: 30
End: 2020-08-24
Payer: COMMERCIAL

## 2020-08-24 DIAGNOSIS — L70.0 ACNE VULGARIS: Primary | ICD-10-CM

## 2020-08-24 RX ORDER — ISOTRETINOIN 40 MG/1
80 CAPSULE ORAL DAILY
Qty: 60 CAPSULE | Refills: 0 | Status: SHIPPED | OUTPATIENT
Start: 2020-08-24 | End: 2020-09-30

## 2020-08-24 ASSESSMENT — PAIN SCALES - GENERAL: PAINLEVEL: NO PAIN (0)

## 2020-08-24 NOTE — PATIENT INSTRUCTIONS
[facility] Dermatology Visit    Thank you for allowing us to participate in your care. Your findings, instructions and follow-up plan are as follows:    Acne  -continue isotretinoin 80mg daily  -start hibiclens 4% wash (chlorhexadine) on the back and chest 2-3x per week. Avoid eyes, nose, mouth and face    When should I call my doctor?    If you are worsening or not improving, please, contact us or seek urgent care as noted below.     Who should I call with questions (adults)?    [facility] (adult and pediatric): [phone]     [facility] (adult): [phone]    For urgent needs outside of business hours call the [facility] at [phone] and ask for the dermatology resident on call    If this is a medical emergency and you are unable to reach an ER, Call [phone]      Who should I call with questions (pediatric)?  [facility]- Pediatric Dermatology  Dr. Michelle Jarvis, Dr. Otis Urias, Dr. Hannah Burciaga, La Nena Patterson, SOLOMON Jameson, Dr. Kori Joyce & Dr. Shoaib Oneill  Non Urgent  Nurse Triage Line; [phone]- Mi and Nilam RN Care Coordinators   Emilie (/Complex ) [phone]    If you need a prescription refill, please contact your pharmacy. Refills are approved or denied by our Physicians during normal business hours, Monday through Fridays  Per office policy, refills will not be granted if you have not been seen within the past year (or sooner depending on your child's condition)    Scheduling Information:  Pediatric Appointment Scheduling and Call Center [phone]  Radiology Scheduling- [phone]  Sedation Unit Scheduling- [phone]  [place] Scheduling- General [phone]; Pediatric Dermatology [phone]  Main  Services: [phone]  Azeri: [phone]  Montenegrin: [phone]  Hmong/Yusuf/Chinese:  [phone]  Preadmission Nursing Department Fax Number: [phone] (Fax all pre-operative paperwork to this number)    For urgent matters arising during evenings, weekends, or holidays that cannot wait for normal business hours please call [phone] and ask for the Dermatology Resident On-Call to be paged.

## 2020-08-24 NOTE — PROGRESS NOTES
[facility]  Store and Forward and Video ( Invitation sent by:  [employer] ([employer]) and text to cell phone ) [phone]    Impression and Recommendations (Patient Counseled on the Following):  1.Acne vulgaris, severe nodulocystic primarily on the trunk/torso - would like for  Him to go 30 days without any new acne breakouts. Suspect secondary folliculitis/skin infection on the back as he continues to get new breakouts there.  -Hibiclens 4% wash 2-3x weekly - completed 14 days of Keflex, which was helpful  -At this visit, we will continue the dose of isotretinoin to 80mg daily. One month supply with no refills provided. Goal dose is 220mg/kg for this 81.81 kg patient.    -Labs reviewed, mildly lipids elevated, but not triglycerides. Stable. No need for repeat lab testing. Advised increase in physical activity and dietary changes.  -Total cumulative dose 32362 mg (190.68mg/kg)  -Patient's iPledge # is [id number].   -Recommended Cerave moisturizing cream and Cetaphil cetaphil    Follow-up:   Follow-up with dermatology in approximately 1mo. Earlier for new or changing lesions or rash.   CC Dr. Joyce on close of this encounter     Staff only:    All risks, benefits and alternatives were discussed with patient.  Patient is in agreement and understands the assessment and plan.  All questions were answered.    Elaine Reyes PA-C, MPAS  [facility]: Phone: [phone], Fax: [phone]  [facility]: Phone: [phone],  Fax: [phone]  _____________________________________________________________________________    Dermatology Problem List:  1. Acne vulgaris  -Current tx: Start isotretinoin 40 mg every day 1/27/2020, increased to 80 mg every day 2/24/2020 - end of month #7  -Secondary follicular infection - Keflex 500mg po BID x 14 days completed, hibiclens 4% wash    Encounter Date: Aug 24, 2020    CC:   Chief  Complaint   Patient presents with     Accutane     Collins is following up on accutane.        History of Present Illness:  I have reviewed the teledermatology information and the nursing intake corresponding to this issue. Collins Dunaway is a 29 year old male who presents via teledermatology for acne/isotretinoin follow-up. He finished the antibiotic, and that seemed to help with his back breakouts. Once he stopped the keflex, he got more outbreaks. He continues to get new breakouts on the upper back, but that is really the only area remaining. Lower and mid back have stopped with new erruptions. Overall doing well. Face is clear. Dry skin is tolerable. Is wearing sunscreen. The patient reports tolerable mucocutaneous dryness, and denies arthralgias, myalgias, depression, suicidal ideation, diarrhea, headache, or blurred vision.      ROS: Patient is generally feeling well today  -Constitutional: The patient denies fatigue, fevers, chills, unintended weight loss, and night sweats.  -HEENT: Patient denies nonhealing oral sores.  -Skin: As above in HPI. No additional skin concerns.  -Neuro: no HA or vision changes  -GI: No nausea, blood in stool, diarrhea, hx of IBD  -Psych: no depression/anxiety, mood changes, or sleep problems   -Musculoskeletal: no joint or muscle pain or swelling   -Heme/Lymph: no concerning bumps, no bleeding problems     Physical Examination:  General: Well-appearing, appropriately-developed individual.  Skin: Focused examination within the teledermatology photograph(s) including head/face was performed.   -face is clear  -chest with scattered erythematous macules  -back with scattered erythematous macules and few new papules - many areas appear to hyperpigmented and helaing with very few new active papules  -scarring noted on back    Labs:  Reviewed, no need for further lab testing    Past Medical History:   There is no problem list on file for this patient.    No past medical history on  file.  No past surgical history on file.    Social History:  Patient reports that he has never smoked. He has never used smokeless tobacco.    Family History:  No family history on file.    Medications:  Current Outpatient Medications   Medication     cephALEXin (KEFLEX) 500 MG capsule     ISOtretinoin (ACCUTANE) 40 MG capsule     No current facility-administered medications for this visit.        Allergies   Allergen Reactions     Cats    __________________________________________________________________________    Teledermatology information:  - Location of patient: [place]  - Patient presented as: return  - Location of teledermatologist:  ([facility] DERMATOLOGY )  - Reason teledermatology is appropriate:  of National Emergency Regarding Coronavirus disease (COVID 19) Outbreak  - Image quality and interpretability: acceptable  - Physician has received verbal consent for a Video/Photos Visit from the patient? Yes  - In-person dermatology visit recommendation: no  - Date of images: 8/24/20  - Length of call: 6min  - Date of report: 8/24/2020

## 2020-08-24 NOTE — LETTER
8/24/2020       RE: Collins Dunaway  [address]  [place] [address]     Dear Colleague,    Thank you for referring your patient, Collins Dunaway, to the [facility] DERMATOLOGY at [facility]. Please see a copy of my visit note below.    [facility]  Store and Forward and Video ( Invitation sent by:  [employer] and text to cell phone ) [phone]    Impression and Recommendations (Patient Counseled on the Following):  1.Acne vulgaris, severe nodulocystic primarily on the trunk/torso - would like for  Him to go 30 days without any new acne breakouts. Suspect secondary folliculitis/skin infection on the back as he continues to get new breakouts there.  -Hibiclens 4% wash 2-3x weekly - completed 14 days of Keflex, which was helpful  -At this visit, we will continue the dose of isotretinoin to 80mg daily. One month supply with no refills provided. Goal dose is 220mg/kg for this 81.81 kg patient.    -Labs reviewed, mildly lipids elevated, but not triglycerides. Stable. No need for repeat lab testing. Advised increase in physical activity and dietary changes.  -Total cumulative dose 73269 mg (190.68mg/kg)  -Patient's iPledge # is [id number].   -Recommended Cerave moisturizing cream and Cetaphil cetaphil    Follow-up:   Follow-up with dermatology in approximately 1mo. Earlier for new or changing lesions or rash.   CC Dr. Joyce on close of this encounter     Staff only:    All risks, benefits and alternatives were discussed with patient.  Patient is in agreement and understands the assessment and plan.  All questions were answered.    Elaine Reyes PA-C, MPAS  [facility] [place]: Phone: [phone], Fax: [phone]  [facility]: Phone: [phone],  Fax: [phone]  _____________________________________________________________________________    Dermatology Problem List:  1.  Acne vulgaris  -Current tx: Start isotretinoin 40 mg every day 1/27/2020, increased to 80 mg every day 2/24/2020 - end of month #7  -Secondary follicular infection - Keflex 500mg po BID x 14 days completed, hibiclens 4% wash    Encounter Date: Aug 24, 2020    CC:   Chief Complaint   Patient presents with     Accutane     Collins is following up on accutane.        History of Present Illness:  I have reviewed the teledermatology information and the nursing intake corresponding to this issue. Collins Dunaway is a 29 year old male who presents via teledermatology for acne/isotretinoin follow-up. He finished the antibiotic, and that seemed to help with his back breakouts. Once he stopped the keflex, he got more outbreaks. He continues to get new breakouts on the upper back, but that is really the only area remaining. Lower and mid back have stopped with new erruptions. Overall doing well. Face is clear. Dry skin is tolerable. Is wearing sunscreen. The patient reports tolerable mucocutaneous dryness, and denies arthralgias, myalgias, depression, suicidal ideation, diarrhea, headache, or blurred vision.      ROS: Patient is generally feeling well today  -Constitutional: The patient denies fatigue, fevers, chills, unintended weight loss, and night sweats.  -HEENT: Patient denies nonhealing oral sores.  -Skin: As above in HPI. No additional skin concerns.  -Neuro: no HA or vision changes  -GI: No nausea, blood in stool, diarrhea, hx of IBD  -Psych: no depression/anxiety, mood changes, or sleep problems   -Musculoskeletal: no joint or muscle pain or swelling   -Heme/Lymph: no concerning bumps, no bleeding problems     Physical Examination:  General: Well-appearing, appropriately-developed individual.  Skin: Focused examination within the teledermatology photograph(s) including head/face was performed.   -face is clear  -chest with scattered erythematous macules  -back with scattered erythematous macules and few new papules -  many areas appear to hyperpigmented and helaing with very few new active papules  -scarring noted on back    Labs:  Reviewed, no need for further lab testing    Past Medical History:   There is no problem list on file for this patient.    No past medical history on file.  No past surgical history on file.    Social History:  Patient reports that he has never smoked. He has never used smokeless tobacco.    Family History:  No family history on file.    Medications:  Current Outpatient Medications   Medication     cephALEXin (KEFLEX) 500 MG capsule     ISOtretinoin (ACCUTANE) 40 MG capsule     No current facility-administered medications for this visit.        Allergies   Allergen Reactions     Cats    __________________________________________________________________________    Teledermatology information:  - Location of patient: [place]  - Patient presented as: return  - Location of teledermatologist:  [facility] DERMATOLOGY )  - Reason teledermatology is appropriate:  of National Emergency Regarding Coronavirus disease (COVID 19) Outbreak  - Image quality and interpretability: acceptable  - Physician has received verbal consent for a Video/Photos Visit from the patient? Yes  - In-person dermatology visit recommendation: no  - Date of images: 8/24/20  - Length of call: 6min  - Date of report: 8/24/2020     Again, thank you for allowing me to participate in the care of your patient.      Sincerely,    Elaine Reyes PA-C

## 2020-09-28 ENCOUNTER — OFFICE VISIT (OUTPATIENT)
Dept: DERMATOLOGY | Facility: CLINIC | Age: 30
End: 2020-09-28
Payer: COMMERCIAL

## 2020-09-28 DIAGNOSIS — L90.5 ACNE SCARRING: ICD-10-CM

## 2020-09-28 DIAGNOSIS — Z79.899 ON ISOTRETINOIN THERAPY: ICD-10-CM

## 2020-09-28 DIAGNOSIS — L81.0 POST-INFLAMMATORY HYPERPIGMENTATION: ICD-10-CM

## 2020-09-28 DIAGNOSIS — L70.0 ACNE VULGARIS: Primary | ICD-10-CM

## 2020-09-28 DIAGNOSIS — B96.89 SUPERFICIAL BACTERIAL INFECTION OF SKIN: ICD-10-CM

## 2020-09-28 DIAGNOSIS — L08.9 SUPERFICIAL BACTERIAL INFECTION OF SKIN: ICD-10-CM

## 2020-09-28 DIAGNOSIS — D17.21 LIPOMA OF RIGHT UPPER EXTREMITY: ICD-10-CM

## 2020-09-28 ASSESSMENT — PAIN SCALES - GENERAL: PAINLEVEL: NO PAIN (0)

## 2020-09-28 NOTE — PROGRESS NOTES
"[facility] Dermatology Note      Dermatology Problem List:  1. Acne vulgaris  -Previous tx: isotretinoin completed 9/28/20 - total cumulative dose 73777 mg (220mg/kg)  -Secondary follicular infection - Keflex 500mg po BID x 14 days completed, hibiclens 4% wash  2. Lipoma - referral to derm surg for excision in the future    CC:   Chief Complaint   Patient presents with     Accutane     Collins is here today for a accutane follow up     Encounter Date: Sep 28, 2020    History of Present Illness:  Mr. Collins Dunaway is a 29 year old male who returns today regarding acne on the face chest and back. He was last seen via virtual visit for isotretinoin on 8/24/20. He has been getting some rebound acne, which previously responded well to a short course of Keflex. However, upon finishing the keflex the new acne returned. We decided last month then to start hibiclens wash 2-3x weekly. Overall, he is vastly improved. The patient reports tolerable mucocutaneous dryness, and denies arthralgias, myalgias, depression, suicidal ideation, diarrhea, headache, or blurred vision. He has one week of isotretinoin left to take.    Additionally notes a bump on the back of the R shoulder.  Wondering about removal. It is not symptomatic, but he forde not like the appearance of it. Has been there a \"long time\". Otherwise well with no other concerns.     Past Medical History:   There is no problem list on file for this patient.    History reviewed. No pertinent past medical history.  History reviewed. No pertinent surgical history.    Social History:  Patient reports that he has never smoked. He has never used smokeless tobacco.    Family History:  Not obtained today.    Medications:  Current Outpatient Medications   Medication Sig Dispense Refill     chlorhexidine (HIBICLENS) 4 % liquid Use as body wash, especially chest and back 2-3x per week 475 mL 11     ISOtretinoin (ACCUTANE) 40 MG capsule Take 2 capsules (80 mg) by " mouth daily 60 capsule 0     Allergies   Allergen Reactions     Cats      Review of Systems:  -Neuro: no HA or vision changes  -GI: No nausea, blood in stool, diarrhea, hx of IBD  -Psych: no depression/anxiety, mood changes, or sleep problems   -Musculoskeletal: no joint or muscle pain or swelling   -Heme/Lymph: no concerning bumps, no bleeding problem  -Constitutional: The patient denies fatigue, fevers, chills, unintended weight loss, and night sweats.  -HEENT: Patient denies nonhealing oral sores.  -Skin: As above in HPI. No additional skin concerns.    Physical exam:  Vitals: There were no vitals taken for this visit.  GEN: This is a well developed, well-nourished male in no acute distress, in a pleasant mood.    SKIN: Waist-up skin, which includes the head/face, neck, both arms, chest, back, abdomen, digits and/or nails was examined.  -R posterior shoulder - 2cm discrete, non -tender mobile soft mass  -back - 3 active papules on the upper mid back  -numerous atrophic scars scattered across the back, many erythematous macules from previously healing areas  -No other lesions of concern on areas examined.     Impression/Plan:  1.Acne vulgaris, severe nodulocystic primarily on the trunk/torso with PIH and scarring of the back, Completed isotretinoin course today, high dose, but continues to get a few active breakouts particularly on the shoulders. He responded well to keflex in the past.   -Bacterial culture of the R shoulder - will tx pending result  -Continue hibiclens 2-3x/week  -At this visit, we will discontinue isotretinoin. Goal dose is 220mg/kg for this 81.81 kg patient, and this has been reached.    -Labs reviewed, mildly lipids elevated, but not triglycerides. Stable. No need for repeat lab testing. Advised increase in physical activity and dietary changes.  -Total cumulative dose 41223 mg (220mg/kg)  -Patient's iPledge # is [id number].   -Recommended Cerave moisturizing cream and Cetaphil cetaphil    2.  Lipoma - derm surg referral for excision    3. Acne Scarring/PIH - back  -discussed pigment change and poc marks - will improve slowly over time  -laser can be helpful in future if desired, deferred for now    CC Dr. Joyce on close of this encounter.  Follow-up in 3 months, earlier for new or changing lesions.     Staff Involved:  Staff Only  All risks, benefits and alternatives were discussed with patient.  Patient is in agreement and understands the assessment and plan.  All questions were answered.    Elaine Reyes PA-C, MPAS  [facility] [place]: Phone: [phone], Fax: [phone]  [facility]: Phone: [phone],  Fax: [phone]

## 2020-09-28 NOTE — NURSING NOTE
Dermatology Rooming Note    Collins Dunaway's goals for this visit include:   Chief Complaint   Patient presents with     Accutane     Collins is here today for a accutane follow up     JOY Randhawa

## 2020-09-28 NOTE — LETTER
"9/28/2020       RE: Collins Dunaway  [address]  [facility] [address]     Dear Colleague,    Thank you for referring your patient, Collins Dunaway, to the [facility] DERMATOLOGY at [facility]. Please see a copy of my visit note below.    [facility] Dermatology Note      Dermatology Problem List:  1. Acne vulgaris  -Previous tx: isotretinoin completed 9/28/20 - total cumulative dose 43754 mg (220mg/kg)  -Secondary follicular infection - Keflex 500mg po BID x 14 days completed, hibiclens 4% wash  2. Lipoma - referral to derm surg for excision in the future    CC:   Chief Complaint   Patient presents with     Accutane     Collins is here today for a accutane follow up     Encounter Date: Sep 28, 2020    History of Present Illness:  Mr. Collins Dunaway is a 29 year old male who returns today regarding acne on the face chest and back. He was last seen via virtual visit for isotretinoin on 8/24/20. He has been getting some rebound acne, which previously responded well to a short course of Keflex. However, upon finishing the keflex the new acne returned. We decided last month then to start hibiclens wash 2-3x weekly. Overall, he is vastly improved. The patient reports tolerable mucocutaneous dryness, and denies arthralgias, myalgias, depression, suicidal ideation, diarrhea, headache, or blurred vision. He has one week of isotretinoin left to take.    Additionally notes a bump on the back of the R shoulder.  Wondering about removal. It is not symptomatic, but he forde not like the appearance of it. Has been there a \"long time\". Otherwise well with no other concerns.     Past Medical History:   There is no problem list on file for this patient.    History reviewed. No pertinent past medical history.  History reviewed. No pertinent surgical history.    Social History:  Patient reports that he has never smoked. He has never used smokeless tobacco.    Family History:  Not " obtained today.    Medications:  Current Outpatient Medications   Medication Sig Dispense Refill     chlorhexidine (HIBICLENS) 4 % liquid Use as body wash, especially chest and back 2-3x per week 475 mL 11     ISOtretinoin (ACCUTANE) 40 MG capsule Take 2 capsules (80 mg) by mouth daily 60 capsule 0     Allergies   Allergen Reactions     Cats      Review of Systems:  -Neuro: no HA or vision changes  -GI: No nausea, blood in stool, diarrhea, hx of IBD  -Psych: no depression/anxiety, mood changes, or sleep problems   -Musculoskeletal: no joint or muscle pain or swelling   -Heme/Lymph: no concerning bumps, no bleeding problem  -Constitutional: The patient denies fatigue, fevers, chills, unintended weight loss, and night sweats.  -HEENT: Patient denies nonhealing oral sores.  -Skin: As above in HPI. No additional skin concerns.    Physical exam:  Vitals: There were no vitals taken for this visit.  GEN: This is a well developed, well-nourished male in no acute distress, in a pleasant mood.    SKIN: Waist-up skin, which includes the head/face, neck, both arms, chest, back, abdomen, digits and/or nails was examined.  -R posterior shoulder - 2cm discrete, non -tender mobile soft mass  -back - 3 active papules on the upper mid back  -numerous atrophic scars scattered across the back, many erythematous macules from previously healing areas  -No other lesions of concern on areas examined.     Impression/Plan:  1.Acne vulgaris, severe nodulocystic primarily on the trunk/torso with PIH and scarring of the back, Completed isotretinoin course today, high dose, but continues to get a few active breakouts particularly on the shoulders. He responded well to keflex in the past.   -Bacterial culture of the R shoulder - will tx pending result  -Continue hibiclens 2-3x/week  -At this visit, we will discontinue isotretinoin. Goal dose is 220mg/kg for this 81.81 kg patient, and this has been reached.    -Labs reviewed, mildly lipids  elevated, but not triglycerides. Stable. No need for repeat lab testing. Advised increase in physical activity and dietary changes.  -Total cumulative dose 37156 mg (220mg/kg)  -Patient's iPledge # is [id number].   -Recommended Cerave moisturizing cream and Cetaphil cetaphil    2. Lipoma - derm surg referral for excision    3. Acne Scarring/PIH - back  -discussed pigment change and poc marks - will improve slowly over time  -laser can be helpful in future if desired, deferred for now    CC Dr. Joyce on close of this encounter.  Follow-up in 3 months, earlier for new or changing lesions.     Staff Involved:  Staff Only  All risks, benefits and alternatives were discussed with patient.  Patient is in agreement and understands the assessment and plan.  All questions were answered.    Elaine Reyes PA-C, MPAS  [facility] [place]: Phone: [phone], Fax: [phone]  [facility]: Phone: [phone],  Fax: [phone]              Again, thank you for allowing me to participate in the care of your patient.      Sincerely,    Elaine Reyes PA-C

## 2020-09-28 NOTE — LETTER
Date:October 3, 2020      Patient was self referred, no letter generated. Do not send.        [facility] Health Information

## 2020-09-30 LAB
BACTERIA SPEC CULT: NORMAL
SPECIMEN SOURCE: NORMAL

## 2020-12-21 ENCOUNTER — VIRTUAL VISIT (OUTPATIENT)
Dept: DERMATOLOGY | Facility: CLINIC | Age: 30
End: 2020-12-21
Payer: COMMERCIAL

## 2020-12-21 DIAGNOSIS — D17.21 LIPOMA OF RIGHT UPPER EXTREMITY: Primary | ICD-10-CM

## 2020-12-21 DIAGNOSIS — L70.0 ACNE VULGARIS: ICD-10-CM

## 2020-12-21 PROCEDURE — 99212 OFFICE O/P EST SF 10 MIN: CPT | Mod: 95 | Performed by: PHYSICIAN ASSISTANT

## 2020-12-21 RX ORDER — BENZOYL PEROXIDE 58.7 MG/G
CREAM TOPICAL DAILY
Qty: 204 G | Refills: 11 | Status: SHIPPED | OUTPATIENT
Start: 2020-12-21

## 2020-12-21 NOTE — NURSING NOTE
Dermatology Rooming Note    Collins Dunaway's goals for this visit include:   Chief Complaint   Patient presents with     Derm Problem     Acne - post accutane follow up. Collins notes that his skin  is doing well. He has had a few break outs.     Jeanie Ramírez, CMA

## 2020-12-21 NOTE — PROGRESS NOTES
[facility] Dermatology Record:  Store and Forward and Telephone [phone]      Dermatology Problem List:  1. Acne vulgaris with PIH  -current: BPO 4% wash to chest/back, occasional hibiclens 4% use with flares  -Previous tx: isotretinoin completed 9/28/20 - total cumulative dose 85476 mg (220mg/kg)  -Secondary follicular infection - Keflex 500mg po BID x 14 days completed, hibiclens 4% wash  2. Lipoma - referral to derm surg for excision in the future    Encounter Date: Dec 21, 2020    CC:   Chief Complaint   Patient presents with     Derm Problem     Acne - post accutane follow up. Collins notes that his skin  is doing well. He has had a few break outs.       History of Present Illness:  Collins Dunaway is a 30 year old male who presents for post accutane follow up. For the most part things have been good. Face has remained clear. Chest and back has had a few breakouts but nothing as severe as before. He is not currently using any antiseptic or antibacterial body washes at this time. Does note scars are improving.     Regarding lipoma on shoulder, would like to set up appointment for removal. Otherwise well, no other concerns.        ROS: Patient is generally feeling well today   -Constitutional: no unintended weight loss/gain, no night sweats or fevers  -Skin as per HPI    Physical Examination:  General: Well-appearing, appropriately-developed individual.  Skin:  Exam was performed by photo review and is significant for the following:  -scars improving, less hyperpigmented  -few active papules on chest  -overall vastly improved    Labs:  n/a    Past Medical History:   There is no problem list on file for this patient.    No past medical history on file.  No past surgical history on file.    Social History:  Patient reports that he has never smoked. He has never used smokeless tobacco.    Family History:  No family history on file.    Medications:  Current Outpatient Medications   Medication     chlorhexidine  (HIBICLENS) 4 % liquid     No current facility-administered medications for this visit.        Allergies   Allergen Reactions     Cats        Impression and Recommendations (Patient Counseled on the Following):  1. Acne Vulgaris/PIH  -s/p isotretinoin 220mg/kg  -start BPO 4% body wash daily or every other day  -Hibiclens 4% with acne flares on trunk/torso    Follow-up:   Follow-up with dermatology in approximately 6mo. Earlier for new or changing lesions or rash.   CC Dr. Joyce on close of this encounter.     Staff only    All risks, benefits and alternatives were discussed with patient.  Patient is in agreement and understands the assessment and plan.  All questions were answered.    Elaine Reyes PA-C, MPAS  [facility] [place]: Phone: [phone], Fax: [phone]  [facility]: Phone: [phone],  Fax: [phone]  _____________________________________________________________________________    Teledermatology information:  - Patient presented as: return  - Location of teledermatologist:  ([facility] DERMATOLOGY CLINIC [place] )  - Image quality and interpretability: acceptable  - Physician has received verbal consent for a Video/Photos Visit from the patient? YES  - In-person dermatology visit recommendation: no  - Date of images: 12/21/20  - Length of call: 6min  - Date of report: 12/21/2020

## 2020-12-21 NOTE — LETTER
Date:December 26, 2020      Patient was self referred, no letter generated. Do not send.        [facility] Health Information

## 2020-12-21 NOTE — PATIENT INSTRUCTIONS
[facility] Dermatology Visit    Thank you for allowing us to participate in your care. Your findings, instructions and follow-up plan are as follows:       See progress note    When should I call my doctor?    If you are worsening or not improving, please, contact us or seek urgent care as noted below.     Who should I call with questions (adults)?    [facility] (adult and pediatric): [phone]     [facility] (adult): [phone]    For urgent needs outside of business hours call the [facility] at [phone] and ask for the dermatology resident on call    If this is a medical emergency and you are unable to reach an ER, Call 911      Who should I call with questions (pediatric)?  [facility]- Pediatric Dermatology  Dr. Michelle Jarvis, Dr. Otis Urias, Dr. Hannah Burciaga, La Nena Patterson, PA  Dr. Leilani Jameson, Dr. Kori Joyce & Dr. Shoaib Oneill  Non Urgent  Nurse Triage Line; [phone]- Mi and Nilam FRANCES Care Coordinators   Emilie (/Complex ) [phone]    If you need a prescription refill, please contact your pharmacy. Refills are approved or denied by our Physicians during normal business hours, Monday through Fridays  Per office policy, refills will not be granted if you have not been seen within the past year (or sooner depending on your child's condition)    Scheduling Information:  Pediatric Appointment Scheduling and Call Center [phone]  Radiology Scheduling- [phone]  Sedation Unit Scheduling- [phone]  [place] Scheduling- General [phone]; Pediatric Dermatology [phone]  Main  Services: [phone]  Divehi: [phone]  Kuwaiti: [phone]  Hmong/Yusuf/Cook Islander: [phone]  Preadmission Nursing Department Fax Number: [phone] (Fax all pre-operative paperwork to this number)    For urgent matters arising  during evenings, weekends, or holidays that cannot wait for normal business hours please call [phone] and ask for the Dermatology Resident On-Call to be paged.

## 2020-12-21 NOTE — LETTER
12/21/2020       RE: Collins Dunaway  [address]  [facility] [address]     Dear Colleague,    Thank you for referring your patient, Collins Dunaway, to the [facility] DERMATOLOGY CLINIC [place] at [facility]. Please see a copy of my visit note below.    [facility] Dermatology Record:  Store and Forward and Telephone [phone]      Dermatology Problem List:  1. Acne vulgaris with PIH  -current: BPO 4% wash to chest/back, occasional hibiclens 4% use with flares  -Previous tx: isotretinoin completed 9/28/20 - total cumulative dose 33450 mg (220mg/kg)  -Secondary follicular infection - Keflex 500mg po BID x 14 days completed, hibiclens 4% wash  2. Lipoma - referral to derm surg for excision in the future    Encounter Date: Dec 21, 2020    CC:   Chief Complaint   Patient presents with     Derm Problem     Acne - post accutane follow up. Collins notes that his skin  is doing well. He has had a few break outs.       History of Present Illness:  Collins Dunaway is a 30 year old male who presents for post accutane follow up. For the most part things have been good. Face has remained clear. Chest and back has had a few breakouts but nothing as severe as before. He is not currently using any antiseptic or antibacterial body washes at this time. Does note scars are improving.     Regarding lipoma on shoulder, would like to set up appointment for removal. Otherwise well, no other concerns.        ROS: Patient is generally feeling well today   -Constitutional: no unintended weight loss/gain, no night sweats or fevers  -Skin as per HPI    Physical Examination:  General: Well-appearing, appropriately-developed individual.  Skin:  Exam was performed by photo review and is significant for the following:  -scars improving, less hyperpigmented  -few active papules on chest  -overall vastly improved    Labs:  n/a    Past Medical History:   There is no problem list on file for this  patient.    No past medical history on file.  No past surgical history on file.    Social History:  Patient reports that he has never smoked. He has never used smokeless tobacco.    Family History:  No family history on file.    Medications:  Current Outpatient Medications   Medication     chlorhexidine (HIBICLENS) 4 % liquid     No current facility-administered medications for this visit.        Allergies   Allergen Reactions     Cats        Impression and Recommendations (Patient Counseled on the Following):  1. Acne Vulgaris/PIH  -s/p isotretinoin 220mg/kg  -start BPO 4% body wash daily or every other day  -Hibiclens 4% with acne flares on trunk/torso    Follow-up:   Follow-up with dermatology in approximately 6mo. Earlier for new or changing lesions or rash.   CC Dr. Joyce on close of this encounter.     Staff only    All risks, benefits and alternatives were discussed with patient.  Patient is in agreement and understands the assessment and plan.  All questions were answered.    Elaine Reyes PA-C, RUSTS  [facility] [place]: Phone: [phone], Fax: [phone]  [facility]: Phone: [phone],  Fax: [phone]  _____________________________________________________________________________    Teledermatology information:  - Patient presented as: return  - Location of teledermatologist:  ([facility] DERMATOLOGY CLINIC [place] )  - Image quality and interpretability: acceptable  - Physician has received verbal consent for a Video/Photos Visit from the patient? YES  - In-person dermatology visit recommendation: no  - Date of images: 12/21/20  - Length of call: 6min  - Date of report: 12/21/2020       Again, thank you for allowing me to participate in the care of your patient.      Sincerely,    Elaine Reyes PA-C

## 2021-01-04 ENCOUNTER — HEALTH MAINTENANCE LETTER (OUTPATIENT)
Age: 31
End: 2021-01-04

## 2021-01-27 ENCOUNTER — VIRTUAL VISIT (OUTPATIENT)
Dept: DERMATOLOGY | Facility: CLINIC | Age: 31
End: 2021-01-27
Attending: PHYSICIAN ASSISTANT
Payer: COMMERCIAL

## 2021-01-27 DIAGNOSIS — D17.21 LIPOMA OF RIGHT UPPER EXTREMITY: Primary | ICD-10-CM

## 2021-01-27 PROCEDURE — 99212 OFFICE O/P EST SF 10 MIN: CPT | Mod: TEL | Performed by: DERMATOLOGY

## 2021-01-27 ASSESSMENT — PAIN SCALES - GENERAL: PAINLEVEL: NO PAIN (0)

## 2021-01-27 NOTE — PATIENT INSTRUCTIONS
[facility] Dermatology Visit    Thank you for allowing us to participate in your care. Your findings, instructions and follow-up plan are as follows:         When should I call my doctor?    If you are worsening or not improving, please, contact us or seek urgent care as noted below.     Who should I call with questions (adults)?    [facility] (adult and pediatric): [phone]     [facility] (adult): [phone]    For urgent needs outside of business hours call the [facility] at [phone] and ask for the dermatology resident on call    If this is a medical emergency and you are unable to reach an ER, Call 911      Who should I call with questions (pediatric)?  [facility]- Pediatric Dermatology  Dr. Michelle Jarvis, Dr. Otis Urias, Dr. Hannah Burciaga, La Nena Patterson, PA  Dr. Leilani Jameson, Dr. Kori Joyce & Dr. Shoaib Oneill  Non Urgent  Nurse Triage Line; [phone]- Mi and Nilam RN Care Coordinators   Emilie (/Complex ) [phone]    If you need a prescription refill, please contact your pharmacy. Refills are approved or denied by our Physicians during normal business hours, Monday through Fridays  Per office policy, refills will not be granted if you have not been seen within the past year (or sooner depending on your child's condition)    Scheduling Information:  Pediatric Appointment Scheduling and Call Center [phone]  Radiology Scheduling- [phone]  Sedation Unit Scheduling- [phone]  [place] Scheduling- General [phone]; Pediatric Dermatology [phone]  Main  Services: [phone]  Wolof: [phone]  Swazi: [phone]  Hmong/Ukrainian/Khmer: [phone]  Preadmission Nursing Department Fax Number: [phone] (Fax all pre-operative paperwork to this number)    For urgent matters arising during evenings,  weekends, or holidays that cannot wait for normal business hours please call [phone] and ask for the Dermatology Resident On-Call to be paged.

## 2021-01-27 NOTE — LETTER
1/27/2021       RE: Collins Dunaway  [address]  [facility] [address]     Dear Colleague,    Thank you for referring your patient, Collins Dunaway, to the [facility] DERMATOLOGY CLINIC [place] at [facility]. Please see a copy of my visit note below.    [facility] Dermatology Note  Encounter Date: Jan 27, 2021  Store-and-Forward and Telephone ([phone]). Location of teledermatologist: [facility] DERMATOLOGY [facility].  Start time: 8:06. End time: 8:16.    Dermatology Problem List:  1. Right shoulder lipoma: pending excision    ____________________________________________    Assessment & Plan:   1. Lipoma of right shoulder: discussed risks/benefits of procedure. Plan for excision pending scheduling during COVID-19 pandemic.  - derm surg order placed    Procedures Performed:    None    Follow-up: for excision of lipoma    Staff:     Shoaib Martinez MD   of Dermatology  Department of Dermatology  [facility]    ____________________________________________    CC: Derm Problem (Collins would like to discuss a lipoma on the right shoulder )    HPI:  Mr. Collins Dunaway is a(n) 30 year old male who presents today as a return patient for lipoma    Interested in surgical treatment - bothersome but not overtly painful   - right shoulder   - relatively abrupt onset   - no other similar lesions   - no prior drainage    Patient is otherwise feeling well, without additional concerns.    Labs:  NA    Physical Exam:  Vitals: There were no vitals taken for this visit.  SKIN: Teledermatology photos were reviewed; image quality and interpretability: acceptable. Image date: see upload date.  - subcutaneous nodule on the right shoulder  - No other lesions of concern on areas examined.     Medications:  Current Outpatient Medications   Medication     benzoyl peroxide (PANOXYL CREAMY WASH) 4 % LIQD      chlorhexidine (HIBICLENS) 4 % liquid     No current facility-administered medications for this visit.       Past Medical/Surgical History:   There is no problem list on file for this patient.    No past medical history on file.    CC Elaine Reyes PA-C  [address] [place] [address] on close of this encounter.

## 2021-01-27 NOTE — PROGRESS NOTES
[facility] Dermatology Note  Encounter Date: Jan 27, 2021  Store-and-Forward and Telephone ([phone]). Location of teledermatologist: [facility] DERMATOLOGY CLINIC [place].  Start time: 8:06. End time: 8:16.    Dermatology Problem List:  1. Right shoulder lipoma: pending excision    ____________________________________________    Assessment & Plan:   1. Lipoma of right shoulder: discussed risks/benefits of procedure. Plan for excision pending scheduling during COVID-19 pandemic.  - derm surg order placed    Procedures Performed:    None    Follow-up: for excision of lipoma    Staff:     Shoaib Martinez MD   of Dermatology  Department of Dermatology  [facility]    ____________________________________________    CC: Derm Problem (Collins would like to discuss a lipoma on the right shoulder )    HPI:  Mr. Collins Dunaway is a(n) 30 year old male who presents today as a return patient for lipoma    Interested in surgical treatment - bothersome but not overtly painful   - right shoulder   - relatively abrupt onset   - no other similar lesions   - no prior drainage    Patient is otherwise feeling well, without additional concerns.    Labs:  NA    Physical Exam:  Vitals: There were no vitals taken for this visit.  SKIN: Teledermatology photos were reviewed; image quality and interpretability: acceptable. Image date: see upload date.  - subcutaneous nodule on the right shoulder  - No other lesions of concern on areas examined.     Medications:  Current Outpatient Medications   Medication     benzoyl peroxide (PANOXYL CREAMY WASH) 4 % LIQD     chlorhexidine (HIBICLENS) 4 % liquid     No current facility-administered medications for this visit.       Past Medical/Surgical History:   There is no problem list on file for this patient.    No past medical history on file.    CC Elaine Reyes PA-C  [address] [place] [address] on  close of this encounter.

## 2021-01-27 NOTE — NURSING NOTE
Dermatology Rooming Note    Collins Dunaway's goals for this visit include:   Chief Complaint   Patient presents with     Derm Problem     Collins would like to discuss a lipoma on the right shoulder      Eric Almaguer, [facility]

## 2021-04-25 ENCOUNTER — HEALTH MAINTENANCE LETTER (OUTPATIENT)
Age: 31
End: 2021-04-25

## 2021-06-30 ENCOUNTER — TELEPHONE (OUTPATIENT)
Dept: DERMATOLOGY | Facility: CLINIC | Age: 31
End: 2021-06-30

## 2021-06-30 NOTE — TELEPHONE ENCOUNTER
LVM w/patient to call back and schedule procedure with Derm Surg.     Reginaldo Hyde, Facilitator

## 2021-08-23 NOTE — LETTER
Date:August 26, 2020      Patient was self referred, no letter generated. Do not send.        [facility] Health Information       no

## 2021-09-13 ENCOUNTER — TELEPHONE (OUTPATIENT)
Dept: DERMATOLOGY | Facility: CLINIC | Age: 31
End: 2021-09-13

## 2021-09-13 NOTE — TELEPHONE ENCOUNTER
[facility] Call Center    Phone Message    May a detailed message be left on voicemail: yes     Reason for Call: Appointment Intake    Referring Provider Name: Dr. Martinez in Dermatology  Diagnosis and/or Symptoms: DERM SURGERY-Lipoma of right upper extremity   Pt would like to schedule for surgery. It is best to reach Pt on phone early in the afternoon. Thank you    Action Taken: Message routed to:  [facility] Derm    Travel Screening: Not Applicable

## 2021-10-08 ENCOUNTER — OFFICE VISIT (OUTPATIENT)
Dept: FAMILY MEDICINE | Facility: CLINIC | Age: 31
End: 2021-10-08
Attending: DERMATOLOGY
Payer: COMMERCIAL

## 2021-10-08 VITALS — SYSTOLIC BLOOD PRESSURE: 130 MMHG | DIASTOLIC BLOOD PRESSURE: 60 MMHG

## 2021-10-08 DIAGNOSIS — D17.21 LIPOMA OF RIGHT UPPER EXTREMITY: ICD-10-CM

## 2021-10-08 PROCEDURE — 11406 EXC TR-EXT B9+MARG >4.0 CM: CPT | Performed by: DERMATOLOGY

## 2021-10-08 PROCEDURE — 88304 TISSUE EXAM BY PATHOLOGIST: CPT | Performed by: PATHOLOGY

## 2021-10-08 PROCEDURE — 12032 INTMD RPR S/A/T/EXT 2.6-7.5: CPT | Performed by: DERMATOLOGY

## 2021-10-08 NOTE — PROGRESS NOTES
DERMATOLOGIC SURGERY REPORT    NAME OF PROCEDURE:  EXCISION AND INTERMEDIATE CLOSURE    Surgeon:  Mitesh Finch MD      PREOPERATIVE DIAGNOSIS: Lipoma  POSTOPERATIVE DIAGNOSIS: Cyst  FINAL EXCISION SIZE: 60 mm lesion with 0 mm margins  TOTAL EXCISED DIAMETER: 60 mm  FINAL REPAIR LENGTH:  27 mm    INDICATIONS:  This patient presented with a 6 cm hemispheric cyst on the right shoulder. Excision was indicated. We discussed the principles of treatment and most likely complications including bleeding, infection, wound dehiscence, pain, nerve damage, and scarring. Informed consent was obtained and the patient underwent the procedure as follows.    PROCEDURE:  The patient was taken to the operative suite. The treatment area was anesthetized with 1% lidocaine with [id number] epinephrine buffered with bicarbonate. The area was washed with Hibiclens, rinsed with saline and draped with sterile towels. The lesion was delineated and excised down to subcutaneous fat. Hemostasis was obtained by electrocoagulation. With a margin of 0mm, the final excision size was 60 mm x 60 mm.      REPAIR:  In order to repair this defect while maintaining the normal anatomic relations and function, we elected to utilize an intermediate linear closure. Closure was oriented so that the wound was in the patient's natural skin tension lines. Deeper layers of the subcutaneous tissue were undermined first. Deep dermal and subcutaneous layer closure was performed using 4-0 Vicryl deep, intradermal and subcutaneous sutures. Two redundant columns were removed by triangulation. Final cutaneous approximation was achieved with 4-0 Prolene simple running sutures.     The final wound length was 27mm.  A total of 12 mL of anesthesia was administered for all surgical sites. Estimated blood loss was less than 10 mL for all surgical sites. A sterile pressure dressing was applied and wound care instructions, with a written handout, were given. The patient was  discharged from the [facility] alert and ambulatory.    Scribe Disclosure:  I, Daisy Gonzalez, am serving as a scribe to document services personally performed by Mitesh Finch MD based on data collection and the provider's statements to me.      Staff attestation:  The documentation recorded by the scribe accurately reflects the services I personally performed and the decisions I personally made. I have made edits where needed.    Mitesh Finch MD  Dermatology/Dermatopathology Staff Physician  , Department of Dermatology

## 2021-10-08 NOTE — PATIENT INSTRUCTIONS
Excision/Mohs Wound Care Instructions  I will experience scar, altered skin color, bleeding, swelling, pain, crusting and redness. I may experience altered sensation. Risks are excessive bleeding, infection, muscle weakness, thick (hypertrophic or keloidal) scar, and recurrence. A second procedure may be recommended to obtain the best cosmetic or functional result.  Possible complications of any surgical procedure are bleeding, infection, scarring, alteration in skin color and sensation, muscle weakness in the area, wound dehiscence or seperation, or recurrence of the lesion or disease. On occasion, after healing, a secondary procedure or revision may be recommended in order to obtain the best cosmetic or functional result.   After your surgery, a pressure bandage will be placed over the area that has sutures. This will help prevent bleeding. Please follow these instructions until you come back to clinic for suture removal in 10-14 days, as they will help you to prevent complications as your wound heals.  For the First 48 hours After Surgery:  1. Leave the pressure bandage on and keep it dry. If it should come loose, you may retape it, but do not take it off.  2. Relax and take it easy. Do not do any vigorous exercise, heavy lifting, or bending forward. This could cause the wound to bleed.  3. Post-operative pain is usually mild. You may take plain or extra strength Tylenol every 4 hours as needed (do not take more than 4,000mg in one day). Do not take any medicine that contains aspirin, ibuprofen or motrin unless you have been recommended these by a doctor.  Avoid alcohol and vitamin E as these may increase your tendency to bleed.  4. You may put an ice pack around the bandaged area for 20 minutes every 2-3 hours. This may help reduce swelling, bruising, and pain. Make sure the ice pack is waterproof so that the pressure bandage does not get wet.   5. You may see a small amount of drainage or blood on your pressure  bandage. This is normal. However, if drainage or bleeding continues or saturates the bandage, you will need to apply firm pressure over the bandage with a washcloth for 15 minutes. If bleeding continues after applying pressure for 15 minutes then go to the nearest emergency room.  48 Hours After Surgery  Carefully remove the bandage and start daily wound care and dressing changes. You may also now shower and get the wound wet.  Daily Wound Care:  1. Wash wound with a mild soap and water.  Use caution when washing the wound, be gentle and do not let the forceful shower stream hit the wound directly.  2. Pat dry.  3. Apply Vaseline (from a new container or tube) over the suture line with a Q-tip. It is very important to keep the wound continuously moist, as wounds heal best in a moist environment.  4. Keep the site covered until sutures are removed, you can cover it with a Telfa (non-stick) dressing and tape or a band-aid.    5. If you are unable to keep wound covered, you must apply Vaseline every 2-3 hours (while awake) to ensure it is being kept moist for optimal healing. A dressing overnight is recommended to keep the area moist.  Call Us If:  1. You have pain that is not controlled with Tylenol.  2. You have signs or symptoms of an infection, such as: fever over 100 degrees F, redness, warmth, or foul-smelling or yellow/creamy drainage from the wound.  Who should I call with questions?    [facility]: [phone]     [facility]: [phone]    For urgent needs outside of business hours call the [facility] at [phone] and ask to speak with the dermatology resident on call

## 2021-10-08 NOTE — Clinical Note
10/8/2021         RE: Collins Dunaway  [address]  [facility] [address]        Dear Colleague,    Thank you for referring your patient, Collins Dunaway, to the [facility]. Please see a copy of my visit note below.    DERMATOLOGIC SURGERY REPORT    NAME OF PROCEDURE:  EXCISION AND INTERMEDIATE CLOSURE    Surgeon:  Daisy Gonzalez    PREOPERATIVE DIAGNOSIS: Lipoma  POSTOPERATIVE DIAGNOSIS: Same  FINAL EXCISION SIZE: ***mm lesion with ***mm margins  TOTAL EXCISED DIAMETER: ***mm  FINAL REPAIR LENGTH:  ***mm    INDICATIONS:  This patient presented with a ***mm x ***mm *** on the ***. Excision was indicated. We discussed the principles of treatment and most likely complications including bleeding, infection, wound dehiscence, pain, nerve damage, and scarring. Informed consent was obtained and the patient underwent the procedure as follows.    PROCEDURE:  The patient was taken to the operative suite. The treatment area was anesthetized with 1% lidocaine with [id number] epinephrine buffered with bicarbonate. The area was washed with Hibiclens, rinsed with saline and draped with sterile towels. The lesion was delineated and excised down to subcutaneous fat. Hemostasis was obtained by electrocoagulation. With a margin of ***mm, the final excision size was ***mm x ***mm.      REPAIR:  In order to repair this defect while maintaining the normal anatomic relations and function, we elected to utilize an intermediate linear closure. Closure was oriented so that the wound was in the patient's natural skin tension lines. Deeper layers of the subcutaneous tissue were undermined first. Deep dermal and subcutaneous layer closure was performed using ***-0 Vicryl deep, intradermal and subcutaneous sutures. Two redundant columns were removed by triangulation. Final cutaneous approximation was achieved with ***-0 Prolene simple running sutures.     The final wound length was ***mm.  A total of ***mL of  anesthesia was administered for all surgical sites. Estimated blood loss was less than ***mL for all surgical sites. A sterile pressure dressing was applied and wound care instructions, with a written handout, were given. The patient was discharged from the [facility] alert and ambulatory.    Scribe Disclosure:  I, Daisy Gonzalez, am serving as a scribe to document services personally performed by Mitesh Finch MD based on data collection and the provider's statements to me.      Staff attestation:  The documentation recorded by the scribe accurately reflects the services I personally performed and the decisions I personally made. I have made edits where needed.              Again, thank you for allowing me to participate in the care of your patient.        Sincerely,        Mitesh Finch MD

## 2021-10-10 ENCOUNTER — HEALTH MAINTENANCE LETTER (OUTPATIENT)
Age: 31
End: 2021-10-10

## 2021-10-12 ENCOUNTER — TELEPHONE (OUTPATIENT)
Dept: FAMILY MEDICINE | Facility: CLINIC | Age: 31
End: 2021-10-12

## 2021-10-12 LAB
PATH REPORT.COMMENTS IMP SPEC: NORMAL
PATH REPORT.COMMENTS IMP SPEC: NORMAL
PATH REPORT.FINAL DX SPEC: NORMAL
PATH REPORT.GROSS SPEC: NORMAL
PATH REPORT.MICROSCOPIC SPEC OTHER STN: NORMAL
PATH REPORT.RELEVANT HX SPEC: NORMAL

## 2021-10-12 NOTE — TELEPHONE ENCOUNTER
Patient notified of test results and providers message, patient has no further questions.    Ivette HERNANDEZRN BSN  [facility]  [phone]

## 2021-10-12 NOTE — TELEPHONE ENCOUNTER
----- Message from Mitesh Finch MD sent at 10/12/2021  4:32 PM CDT -----  Please let Collins know that the path report confirmed a harmless cyst, and that no additional treatment is needed at that site. Thanks!

## 2021-10-19 ENCOUNTER — ALLIED HEALTH/NURSE VISIT (OUTPATIENT)
Dept: NURSING | Facility: CLINIC | Age: 31
End: 2021-10-19
Payer: COMMERCIAL

## 2021-10-19 DIAGNOSIS — Z48.02 ENCOUNTER FOR REMOVAL OF SUTURES: Primary | ICD-10-CM

## 2021-10-19 PROCEDURE — 99207 PR NO CHARGE NURSE ONLY: CPT

## 2021-10-19 NOTE — NURSING NOTE
Collins Dunaway presents to the clinic today for removal of sutures.  The patient has had the sutures in place for 11 days.  There has been no history of infection or drainage.  1 running sutures and suture are seen located on the right shoulder/back.  The wound is healing well with no signs of infection.  Tetanus status is up to date.   All sutures and suture were easily removed today.  Routine wound care discussed.  The patient will follow up as needed.    Ivette HERNANDEZRN BSN  [facility]  [phone]

## 2022-05-22 ENCOUNTER — HEALTH MAINTENANCE LETTER (OUTPATIENT)
Age: 32
End: 2022-05-22

## 2022-09-18 ENCOUNTER — HEALTH MAINTENANCE LETTER (OUTPATIENT)
Age: 32
End: 2022-09-18

## 2023-06-04 ENCOUNTER — HEALTH MAINTENANCE LETTER (OUTPATIENT)
Age: 33
End: 2023-06-04

## 2024-07-14 ENCOUNTER — HEALTH MAINTENANCE LETTER (OUTPATIENT)
Age: 34
End: 2024-07-14